# Patient Record
Sex: MALE | Race: WHITE | Employment: OTHER | URBAN - METROPOLITAN AREA
[De-identification: names, ages, dates, MRNs, and addresses within clinical notes are randomized per-mention and may not be internally consistent; named-entity substitution may affect disease eponyms.]

---

## 2021-12-08 ENCOUNTER — TRANSCRIBE ORDER (OUTPATIENT)
Dept: SCHEDULING | Age: 86
End: 2021-12-08

## 2021-12-08 DIAGNOSIS — M80.052A FRACTURE OF LEFT HIP DUE TO OSTEOPOROSIS (HCC): Primary | ICD-10-CM

## 2021-12-08 DIAGNOSIS — M80.00XD AGE-RELATED OSTEOPOROSIS WITH CURRENT PATHOLOGICAL FRACTURE WITH ROUTINE HEALING: ICD-10-CM

## 2021-12-17 ENCOUNTER — TRANSCRIBE ORDER (OUTPATIENT)
Dept: SCHEDULING | Age: 86
End: 2021-12-17

## 2021-12-17 DIAGNOSIS — M80.00XD AGE-RELATED OSTEOPOROSIS WITH CURRENT PATHOL FX WITH ROUTINE HEALING: Primary | ICD-10-CM

## 2021-12-17 DIAGNOSIS — M80.052A: ICD-10-CM

## 2022-04-07 ENCOUNTER — TRANSCRIBE ORDER (OUTPATIENT)
Dept: SCHEDULING | Age: 87
End: 2022-04-07

## 2022-04-07 DIAGNOSIS — L03.119 CELLULITIS OF UNSPECIFIED PART OF LIMB: Primary | ICD-10-CM

## 2022-04-08 ENCOUNTER — HOSPITAL ENCOUNTER (OUTPATIENT)
Dept: ULTRASOUND IMAGING | Age: 87
Discharge: HOME | End: 2022-04-08
Attending: FAMILY MEDICINE
Payer: MEDICARE

## 2022-04-08 DIAGNOSIS — L03.119 CELLULITIS OF UNSPECIFIED PART OF LIMB: ICD-10-CM

## 2022-04-08 PROCEDURE — 93970 EXTREMITY STUDY: CPT

## 2023-05-12 ENCOUNTER — OFFICE VISIT (OUTPATIENT)
Age: 88
End: 2023-05-12
Payer: MEDICARE

## 2023-05-12 VITALS
OXYGEN SATURATION: 95 % | DIASTOLIC BLOOD PRESSURE: 62 MMHG | WEIGHT: 174 LBS | HEART RATE: 78 BPM | SYSTOLIC BLOOD PRESSURE: 110 MMHG | RESPIRATION RATE: 18 BRPM

## 2023-05-12 DIAGNOSIS — E11.42 TYPE 2 DIABETES MELLITUS WITH DIABETIC POLYNEUROPATHY, WITHOUT LONG-TERM CURRENT USE OF INSULIN (HCC): ICD-10-CM

## 2023-05-12 DIAGNOSIS — Z85.46 H/O PROSTATE CANCER: ICD-10-CM

## 2023-05-12 DIAGNOSIS — Z97.8 FOLEY CATHETER IN PLACE: ICD-10-CM

## 2023-05-12 DIAGNOSIS — M47.812 NECK ARTHRITIS: ICD-10-CM

## 2023-05-12 DIAGNOSIS — R45.1 AGITATION: ICD-10-CM

## 2023-05-12 DIAGNOSIS — R26.81 GAIT INSTABILITY: ICD-10-CM

## 2023-05-12 DIAGNOSIS — F32.A CHRONIC DEPRESSION: ICD-10-CM

## 2023-05-12 DIAGNOSIS — E55.9 VITAMIN D DEFICIENCY: ICD-10-CM

## 2023-05-12 DIAGNOSIS — E78.00 HYPERCHOLESTEROLEMIA: ICD-10-CM

## 2023-05-12 DIAGNOSIS — Z91.81 AT HIGH RISK FOR FALLS: ICD-10-CM

## 2023-05-12 DIAGNOSIS — M48.07 SPINAL STENOSIS OF LUMBOSACRAL REGION: ICD-10-CM

## 2023-05-12 DIAGNOSIS — I10 PRIMARY HYPERTENSION: Primary | ICD-10-CM

## 2023-05-12 DIAGNOSIS — I25.10 CORONARY ARTERY DISEASE INVOLVING NATIVE CORONARY ARTERY OF NATIVE HEART WITHOUT ANGINA PECTORIS: ICD-10-CM

## 2023-05-12 DIAGNOSIS — R41.3 MEMORY IMPAIRMENT: ICD-10-CM

## 2023-05-12 DIAGNOSIS — R33.9 URINARY RETENTION: ICD-10-CM

## 2023-05-12 DIAGNOSIS — Z87.81 HISTORY OF FRACTURE OF LEFT HIP: ICD-10-CM

## 2023-05-12 PROBLEM — S72.001A CLOSED FRACTURE OF RIGHT HIP (HCC): Status: ACTIVE | Noted: 2023-05-12

## 2023-05-12 PROCEDURE — G8427 DOCREV CUR MEDS BY ELIG CLIN: HCPCS | Performed by: INTERNAL MEDICINE

## 2023-05-12 PROCEDURE — 1036F TOBACCO NON-USER: CPT | Performed by: INTERNAL MEDICINE

## 2023-05-12 PROCEDURE — G8421 BMI NOT CALCULATED: HCPCS | Performed by: INTERNAL MEDICINE

## 2023-05-12 PROCEDURE — 99205 OFFICE O/P NEW HI 60 MIN: CPT | Performed by: INTERNAL MEDICINE

## 2023-05-12 PROCEDURE — 1123F ACP DISCUSS/DSCN MKR DOCD: CPT | Performed by: INTERNAL MEDICINE

## 2023-05-12 RX ORDER — TAMSULOSIN HYDROCHLORIDE 0.4 MG/1
0.4 CAPSULE ORAL DAILY
Qty: 90 CAPSULE | Refills: 1 | Status: SHIPPED | OUTPATIENT
Start: 2023-05-12

## 2023-05-12 RX ORDER — GABAPENTIN 100 MG/1
100 CAPSULE ORAL 2 TIMES DAILY
COMMUNITY
Start: 2023-03-13 | End: 2023-05-12 | Stop reason: SDUPTHER

## 2023-05-12 RX ORDER — METOPROLOL SUCCINATE 25 MG/1
12.5 TABLET, EXTENDED RELEASE ORAL DAILY
COMMUNITY
Start: 2023-03-31 | End: 2023-05-12 | Stop reason: SDUPTHER

## 2023-05-12 RX ORDER — METOPROLOL SUCCINATE 25 MG/1
12.5 TABLET, EXTENDED RELEASE ORAL DAILY
Qty: 90 TABLET | Refills: 1 | Status: SHIPPED | OUTPATIENT
Start: 2023-05-12

## 2023-05-12 RX ORDER — GABAPENTIN 100 MG/1
200 CAPSULE ORAL
COMMUNITY
End: 2023-05-12

## 2023-05-12 RX ORDER — SERTRALINE HYDROCHLORIDE 100 MG/1
100 TABLET, FILM COATED ORAL DAILY
Qty: 90 TABLET | Refills: 1 | Status: SHIPPED | OUTPATIENT
Start: 2023-05-12

## 2023-05-12 RX ORDER — GABAPENTIN 100 MG/1
100 CAPSULE ORAL 2 TIMES DAILY
Qty: 60 CAPSULE | Refills: 5 | Status: SHIPPED | OUTPATIENT
Start: 2023-05-12 | End: 2023-06-11

## 2023-05-12 RX ORDER — ATORVASTATIN CALCIUM 40 MG/1
20 TABLET, FILM COATED ORAL
COMMUNITY
Start: 2023-03-23 | End: 2023-05-12 | Stop reason: ALTCHOICE

## 2023-05-12 RX ORDER — QUETIAPINE FUMARATE 25 MG/1
25 TABLET, FILM COATED ORAL NIGHTLY
COMMUNITY
Start: 2023-03-31 | End: 2023-05-12 | Stop reason: SDUPTHER

## 2023-05-12 RX ORDER — SERTRALINE HYDROCHLORIDE 100 MG/1
100 TABLET, FILM COATED ORAL DAILY
COMMUNITY
End: 2023-05-12 | Stop reason: SDUPTHER

## 2023-05-12 RX ORDER — TAMSULOSIN HYDROCHLORIDE 0.4 MG/1
0.4 CAPSULE ORAL DAILY
COMMUNITY
Start: 2023-03-31 | End: 2023-05-12 | Stop reason: SDUPTHER

## 2023-05-12 RX ORDER — ATORVASTATIN CALCIUM 20 MG/1
20 TABLET, FILM COATED ORAL DAILY
Qty: 90 TABLET | Refills: 1 | Status: SHIPPED | OUTPATIENT
Start: 2023-05-12

## 2023-05-12 RX ORDER — ISOPROPYL ALCOHOL 0.75 G/1
SWAB TOPICAL
COMMUNITY
Start: 2023-02-10 | End: 2023-05-12 | Stop reason: ALTCHOICE

## 2023-05-12 RX ORDER — QUETIAPINE FUMARATE 25 MG/1
25 TABLET, FILM COATED ORAL NIGHTLY
Qty: 90 TABLET | Refills: 1 | Status: SHIPPED | OUTPATIENT
Start: 2023-05-12

## 2023-05-12 SDOH — ECONOMIC STABILITY: INCOME INSECURITY: HOW HARD IS IT FOR YOU TO PAY FOR THE VERY BASICS LIKE FOOD, HOUSING, MEDICAL CARE, AND HEATING?: NOT HARD AT ALL

## 2023-05-12 SDOH — ECONOMIC STABILITY: HOUSING INSECURITY
IN THE LAST 12 MONTHS, WAS THERE A TIME WHEN YOU DID NOT HAVE A STEADY PLACE TO SLEEP OR SLEPT IN A SHELTER (INCLUDING NOW)?: NO

## 2023-05-12 SDOH — ECONOMIC STABILITY: FOOD INSECURITY: WITHIN THE PAST 12 MONTHS, YOU WORRIED THAT YOUR FOOD WOULD RUN OUT BEFORE YOU GOT MONEY TO BUY MORE.: NEVER TRUE

## 2023-05-12 SDOH — ECONOMIC STABILITY: FOOD INSECURITY: WITHIN THE PAST 12 MONTHS, THE FOOD YOU BOUGHT JUST DIDN'T LAST AND YOU DIDN'T HAVE MONEY TO GET MORE.: NEVER TRUE

## 2023-05-12 ASSESSMENT — PATIENT HEALTH QUESTIONNAIRE - PHQ9
2. FEELING DOWN, DEPRESSED OR HOPELESS: 0
SUM OF ALL RESPONSES TO PHQ QUESTIONS 1-9: 0
SUM OF ALL RESPONSES TO PHQ9 QUESTIONS 1 & 2: 0
SUM OF ALL RESPONSES TO PHQ QUESTIONS 1-9: 0
SUM OF ALL RESPONSES TO PHQ QUESTIONS 1-9: 0
1. LITTLE INTEREST OR PLEASURE IN DOING THINGS: 0
SUM OF ALL RESPONSES TO PHQ QUESTIONS 1-9: 0

## 2023-05-12 ASSESSMENT — ENCOUNTER SYMPTOMS
RESPIRATORY NEGATIVE: 1
ALLERGIC/IMMUNOLOGIC NEGATIVE: 1
ABDOMINAL PAIN: 0
SHORTNESS OF BREATH: 0
EYES NEGATIVE: 1
GASTROINTESTINAL NEGATIVE: 1
BACK PAIN: 1

## 2023-05-12 ASSESSMENT — ANXIETY QUESTIONNAIRES
2. NOT BEING ABLE TO STOP OR CONTROL WORRYING: 0
5. BEING SO RESTLESS THAT IT IS HARD TO SIT STILL: 0
IF YOU CHECKED OFF ANY PROBLEMS ON THIS QUESTIONNAIRE, HOW DIFFICULT HAVE THESE PROBLEMS MADE IT FOR YOU TO DO YOUR WORK, TAKE CARE OF THINGS AT HOME, OR GET ALONG WITH OTHER PEOPLE: NOT DIFFICULT AT ALL
6. BECOMING EASILY ANNOYED OR IRRITABLE: 0
1. FEELING NERVOUS, ANXIOUS, OR ON EDGE: 0
7. FEELING AFRAID AS IF SOMETHING AWFUL MIGHT HAPPEN: 0
3. WORRYING TOO MUCH ABOUT DIFFERENT THINGS: 0
4. TROUBLE RELAXING: 0
GAD7 TOTAL SCORE: 0

## 2023-05-12 NOTE — PROGRESS NOTES
nightly at bedtime. DISCONTD: tamsulosin (FLOMAX) 0.4 MG capsule     Sig: Take 1 capsule by mouth daily    DISCONTD: gabapentin (NEURONTIN) 100 MG capsule     Sig: Take 1 capsule by mouth 2 times daily. DISCONTD: atorvastatin (LIPITOR) 40 MG tablet     Si.5 tablets    DISCONTD: Magnesium 400 MG CAPS     Sig: Take by mouth    collagenase 250 UNIT/GM ointment     Sig: Apply topically daily Apply topically daily. DISCONTD: sertraline (ZOLOFT) 100 MG tablet     Sig: Take 1 tablet by mouth daily    DISCONTD: gabapentin (NEURONTIN) 100 MG capsule     Sig: Take 2 capsules by mouth nightly. Max Daily Amount: 200 mg    atorvastatin (LIPITOR) 20 MG tablet     Sig: Take 1 tablet by mouth daily     Dispense:  90 tablet     Refill:  1    gabapentin (NEURONTIN) 100 MG capsule     Sig: Take 1 capsule by mouth 2 times daily for 30 days. Max Daily Amount: 200 mg     Dispense:  60 capsule     Refill:  5    metFORMIN (GLUCOPHAGE) 500 MG tablet     Sig: Take 2 tablets by mouth daily (with breakfast)     Dispense:  90 tablet     Refill:  1    metoprolol succinate (TOPROL XL) 25 MG extended release tablet     Sig: Take 0.5 tablets by mouth daily     Dispense:  90 tablet     Refill:  1    QUEtiapine (SEROQUEL) 25 MG tablet     Sig: Take 1 tablet by mouth nightly at bedtime. Dispense:  90 tablet     Refill:  1    sertraline (ZOLOFT) 100 MG tablet     Sig: Take 1 tablet by mouth daily     Dispense:  90 tablet     Refill:  1    tamsulosin (FLOMAX) 0.4 MG capsule     Sig: Take 1 capsule by mouth daily     Dispense:  90 capsule     Refill:  1    Magnesium 400 MG CAPS     Sig: Take 1 daily     Dispense:  90 capsule     Refill:  1        Return in about 4 weeks (around 2023).      Continue with current medical management and plan  lab results and schedule of future lab studies reviewed with patient  reviewed diet, exercise and weight control  reviewed medications and side effects in detail  F/u with other MD's/ providers as

## 2023-05-13 LAB
25(OH)D3+25(OH)D2 SERPL-MCNC: 25.1 NG/ML (ref 30–100)
ALBUMIN SERPL-MCNC: 3.9 G/DL (ref 3.6–4.6)
ALBUMIN/CREAT UR: 182 MG/G CREAT (ref 0–29)
ALBUMIN/GLOB SERPL: 1.1 {RATIO} (ref 1.2–2.2)
ALP SERPL-CCNC: 232 IU/L (ref 44–121)
ALT SERPL-CCNC: 34 IU/L (ref 0–44)
AST SERPL-CCNC: 38 IU/L (ref 0–40)
BASOPHILS # BLD AUTO: 0.1 X10E3/UL (ref 0–0.2)
BASOPHILS NFR BLD AUTO: 1 %
BILIRUB SERPL-MCNC: 0.2 MG/DL (ref 0–1.2)
BUN SERPL-MCNC: 17 MG/DL (ref 8–27)
BUN/CREAT SERPL: 16 (ref 10–24)
CALCIUM SERPL-MCNC: 10 MG/DL (ref 8.6–10.2)
CHLORIDE SERPL-SCNC: 101 MMOL/L (ref 96–106)
CHOLEST SERPL-MCNC: 157 MG/DL (ref 100–199)
CO2 SERPL-SCNC: 24 MMOL/L (ref 20–29)
CREAT SERPL-MCNC: 1.05 MG/DL (ref 0.76–1.27)
CREAT UR-MCNC: 95.8 MG/DL
EGFRCR SERPLBLD CKD-EPI 2021: 68 ML/MIN/1.73
EOSINOPHIL # BLD AUTO: 0.1 X10E3/UL (ref 0–0.4)
EOSINOPHIL NFR BLD AUTO: 1 %
ERYTHROCYTE [DISTWIDTH] IN BLOOD BY AUTOMATED COUNT: 14.5 % (ref 11.6–15.4)
EST. AVERAGE GLUCOSE BLD GHB EST-MCNC: 148 MG/DL
GLOBULIN SER CALC-MCNC: 3.4 G/DL (ref 1.5–4.5)
GLUCOSE SERPL-MCNC: 173 MG/DL (ref 70–99)
HBA1C MFR BLD: 6.8 % (ref 4.8–5.6)
HCT VFR BLD AUTO: 40 % (ref 37.5–51)
HDLC SERPL-MCNC: 46 MG/DL
HGB BLD-MCNC: 13.2 G/DL (ref 13–17.7)
IMM GRANULOCYTES # BLD AUTO: 0 X10E3/UL (ref 0–0.1)
IMM GRANULOCYTES NFR BLD AUTO: 0 %
IMP & REVIEW OF LAB RESULTS: NORMAL
LDLC SERPL CALC-MCNC: 70 MG/DL (ref 0–99)
LYMPHOCYTES # BLD AUTO: 1.2 X10E3/UL (ref 0.7–3.1)
LYMPHOCYTES NFR BLD AUTO: 14 %
MCH RBC QN AUTO: 29.9 PG (ref 26.6–33)
MCHC RBC AUTO-ENTMCNC: 33 G/DL (ref 31.5–35.7)
MCV RBC AUTO: 91 FL (ref 79–97)
MICROALBUMIN UR-MCNC: 174.5 UG/ML
MONOCYTES # BLD AUTO: 0.8 X10E3/UL (ref 0.1–0.9)
MONOCYTES NFR BLD AUTO: 9 %
NEUTROPHILS # BLD AUTO: 6.4 X10E3/UL (ref 1.4–7)
NEUTROPHILS NFR BLD AUTO: 75 %
PLATELET # BLD AUTO: 320 X10E3/UL (ref 150–450)
POTASSIUM SERPL-SCNC: 4.4 MMOL/L (ref 3.5–5.2)
PROT SERPL-MCNC: 7.3 G/DL (ref 6–8.5)
RBC # BLD AUTO: 4.42 X10E6/UL (ref 4.14–5.8)
SODIUM SERPL-SCNC: 139 MMOL/L (ref 134–144)
TRIGL SERPL-MCNC: 256 MG/DL (ref 0–149)
VLDLC SERPL CALC-MCNC: 41 MG/DL (ref 5–40)
WBC # BLD AUTO: 8.5 X10E3/UL (ref 3.4–10.8)

## 2023-05-16 ENCOUNTER — TELEPHONE (OUTPATIENT)
Age: 88
End: 2023-05-16

## 2023-05-16 DIAGNOSIS — E55.9 VITAMIN D DEFICIENCY: Primary | ICD-10-CM

## 2023-05-16 LAB
APPEARANCE UR: ABNORMAL
BACTERIA #/AREA URNS HPF: ABNORMAL /[HPF]
BACTERIA UR CULT: NORMAL
BILIRUB UR QL STRIP: NEGATIVE
CASTS URNS MICRO: ABNORMAL
CASTS URNS QL MICRO: PRESENT /LPF
COLOR UR: YELLOW
CRYSTALS URNS MICRO: ABNORMAL
EPI CELLS #/AREA URNS HPF: ABNORMAL /HPF (ref 0–10)
GLUCOSE UR QL STRIP: ABNORMAL
HGB UR QL STRIP: NEGATIVE
IMP & REVIEW OF LAB RESULTS: NORMAL
KETONES UR QL STRIP: NEGATIVE
LEUKOCYTE ESTERASE UR QL STRIP: ABNORMAL
MICRO URNS: ABNORMAL
NITRITE UR QL STRIP: POSITIVE
PH UR STRIP: 8.5 [PH] (ref 5–7.5)
PROT UR QL STRIP: ABNORMAL
RBC #/AREA URNS HPF: ABNORMAL /HPF (ref 0–2)
SP GR UR STRIP: 1.02 (ref 1–1.03)
UNIDENT CRYS URNS QL MICRO: PRESENT
URINALYSIS REFLEX: ABNORMAL
UROBILINOGEN UR STRIP-MCNC: 0.2 MG/DL (ref 0.2–1)
WBC #/AREA URNS HPF: >30 /HPF (ref 0–5)

## 2023-05-17 NOTE — TELEPHONE ENCOUNTER
RECOMMENDATIONS:  Low Vitamin D. I have sent in Vitamin D of 50k unit. Take 1 cap every week for 12 weeks then you can purchase Vitamin D of 1000 units and take them daily. Other labs including diabetes and cholesterol look okay  Spoke with patient after verifying name and . Notified patient of lab results and recommendation from provider. Patient verbalized understanding and given a chance to ask questions.

## 2023-05-18 RX ORDER — ERGOCALCIFEROL 1.25 MG/1
50000 CAPSULE ORAL WEEKLY
Qty: 12 CAPSULE | Refills: 0 | Status: SHIPPED | OUTPATIENT
Start: 2023-05-18

## 2023-06-20 ENCOUNTER — TELEPHONE (OUTPATIENT)
Age: 88
End: 2023-06-20

## 2023-06-20 ENCOUNTER — OFFICE VISIT (OUTPATIENT)
Age: 88
End: 2023-06-20
Payer: MEDICARE

## 2023-06-20 VITALS
HEIGHT: 70 IN | OXYGEN SATURATION: 99 % | DIASTOLIC BLOOD PRESSURE: 66 MMHG | SYSTOLIC BLOOD PRESSURE: 122 MMHG | RESPIRATION RATE: 16 BRPM | BODY MASS INDEX: 24.97 KG/M2 | HEART RATE: 76 BPM | TEMPERATURE: 98 F

## 2023-06-20 DIAGNOSIS — E11.621 DIABETIC ULCER OF TOE OF LEFT FOOT ASSOCIATED WITH TYPE 2 DIABETES MELLITUS, WITH FAT LAYER EXPOSED (HCC): ICD-10-CM

## 2023-06-20 DIAGNOSIS — Z00.00 MEDICARE ANNUAL WELLNESS VISIT, SUBSEQUENT: ICD-10-CM

## 2023-06-20 DIAGNOSIS — R45.1 AGITATION: ICD-10-CM

## 2023-06-20 DIAGNOSIS — L97.522 DIABETIC ULCER OF TOE OF LEFT FOOT ASSOCIATED WITH TYPE 2 DIABETES MELLITUS, WITH FAT LAYER EXPOSED (HCC): ICD-10-CM

## 2023-06-20 DIAGNOSIS — E78.00 HYPERCHOLESTEROLEMIA: ICD-10-CM

## 2023-06-20 DIAGNOSIS — R33.9 URINARY RETENTION: ICD-10-CM

## 2023-06-20 DIAGNOSIS — E11.42 TYPE 2 DIABETES MELLITUS WITH DIABETIC POLYNEUROPATHY, WITHOUT LONG-TERM CURRENT USE OF INSULIN (HCC): Primary | ICD-10-CM

## 2023-06-20 DIAGNOSIS — R26.81 GAIT INSTABILITY: ICD-10-CM

## 2023-06-20 DIAGNOSIS — I10 PRIMARY HYPERTENSION: ICD-10-CM

## 2023-06-20 DIAGNOSIS — I25.10 CORONARY ARTERY DISEASE INVOLVING NATIVE CORONARY ARTERY OF NATIVE HEART WITHOUT ANGINA PECTORIS: ICD-10-CM

## 2023-06-20 DIAGNOSIS — Z97.8 FOLEY CATHETER IN PLACE: ICD-10-CM

## 2023-06-20 PROCEDURE — 3044F HG A1C LEVEL LT 7.0%: CPT | Performed by: INTERNAL MEDICINE

## 2023-06-20 PROCEDURE — 1036F TOBACCO NON-USER: CPT | Performed by: INTERNAL MEDICINE

## 2023-06-20 PROCEDURE — G0439 PPPS, SUBSEQ VISIT: HCPCS | Performed by: INTERNAL MEDICINE

## 2023-06-20 PROCEDURE — G8420 CALC BMI NORM PARAMETERS: HCPCS | Performed by: INTERNAL MEDICINE

## 2023-06-20 PROCEDURE — 99214 OFFICE O/P EST MOD 30 MIN: CPT | Performed by: INTERNAL MEDICINE

## 2023-06-20 PROCEDURE — 1123F ACP DISCUSS/DSCN MKR DOCD: CPT | Performed by: INTERNAL MEDICINE

## 2023-06-20 PROCEDURE — G8427 DOCREV CUR MEDS BY ELIG CLIN: HCPCS | Performed by: INTERNAL MEDICINE

## 2023-06-20 RX ORDER — GABAPENTIN 100 MG/1
200 CAPSULE ORAL 2 TIMES DAILY
Qty: 120 CAPSULE | Refills: 2 | Status: SHIPPED | OUTPATIENT
Start: 2023-06-20 | End: 2023-09-18

## 2023-06-20 RX ORDER — QUETIAPINE FUMARATE 25 MG/1
25 TABLET, FILM COATED ORAL NIGHTLY
Qty: 60 TABLET | Refills: 5 | Status: SHIPPED | OUTPATIENT
Start: 2023-06-20 | End: 2023-06-20 | Stop reason: SDUPTHER

## 2023-06-20 RX ORDER — QUETIAPINE FUMARATE 25 MG/1
25 TABLET, FILM COATED ORAL 2 TIMES DAILY
Qty: 60 TABLET | Refills: 5 | Status: SHIPPED | OUTPATIENT
Start: 2023-06-20

## 2023-06-20 SDOH — ECONOMIC STABILITY: INCOME INSECURITY: HOW HARD IS IT FOR YOU TO PAY FOR THE VERY BASICS LIKE FOOD, HOUSING, MEDICAL CARE, AND HEATING?: NOT VERY HARD

## 2023-06-20 SDOH — ECONOMIC STABILITY: FOOD INSECURITY: WITHIN THE PAST 12 MONTHS, YOU WORRIED THAT YOUR FOOD WOULD RUN OUT BEFORE YOU GOT MONEY TO BUY MORE.: NEVER TRUE

## 2023-06-20 SDOH — ECONOMIC STABILITY: FOOD INSECURITY: WITHIN THE PAST 12 MONTHS, THE FOOD YOU BOUGHT JUST DIDN'T LAST AND YOU DIDN'T HAVE MONEY TO GET MORE.: NEVER TRUE

## 2023-06-20 ASSESSMENT — PATIENT HEALTH QUESTIONNAIRE - PHQ9
3. TROUBLE FALLING OR STAYING ASLEEP: 0
8. MOVING OR SPEAKING SO SLOWLY THAT OTHER PEOPLE COULD HAVE NOTICED. OR THE OPPOSITE, BEING SO FIGETY OR RESTLESS THAT YOU HAVE BEEN MOVING AROUND A LOT MORE THAN USUAL: 0
6. FEELING BAD ABOUT YOURSELF - OR THAT YOU ARE A FAILURE OR HAVE LET YOURSELF OR YOUR FAMILY DOWN: 0
SUM OF ALL RESPONSES TO PHQ QUESTIONS 1-9: 0
SUM OF ALL RESPONSES TO PHQ QUESTIONS 1-9: 0
7. TROUBLE CONCENTRATING ON THINGS, SUCH AS READING THE NEWSPAPER OR WATCHING TELEVISION: 0
SUM OF ALL RESPONSES TO PHQ QUESTIONS 1-9: 0
5. POOR APPETITE OR OVEREATING: 0
1. LITTLE INTEREST OR PLEASURE IN DOING THINGS: 0
4. FEELING TIRED OR HAVING LITTLE ENERGY: 0
2. FEELING DOWN, DEPRESSED OR HOPELESS: 0
10. IF YOU CHECKED OFF ANY PROBLEMS, HOW DIFFICULT HAVE THESE PROBLEMS MADE IT FOR YOU TO DO YOUR WORK, TAKE CARE OF THINGS AT HOME, OR GET ALONG WITH OTHER PEOPLE: 0
9. THOUGHTS THAT YOU WOULD BE BETTER OFF DEAD, OR OF HURTING YOURSELF: 0
SUM OF ALL RESPONSES TO PHQ9 QUESTIONS 1 & 2: 0
SUM OF ALL RESPONSES TO PHQ QUESTIONS 1-9: 0

## 2023-06-20 ASSESSMENT — ANXIETY QUESTIONNAIRES
4. TROUBLE RELAXING: 0
1. FEELING NERVOUS, ANXIOUS, OR ON EDGE: 0
5. BEING SO RESTLESS THAT IT IS HARD TO SIT STILL: 0
3. WORRYING TOO MUCH ABOUT DIFFERENT THINGS: 0
IF YOU CHECKED OFF ANY PROBLEMS ON THIS QUESTIONNAIRE, HOW DIFFICULT HAVE THESE PROBLEMS MADE IT FOR YOU TO DO YOUR WORK, TAKE CARE OF THINGS AT HOME, OR GET ALONG WITH OTHER PEOPLE: NOT DIFFICULT AT ALL
2. NOT BEING ABLE TO STOP OR CONTROL WORRYING: 0
6. BECOMING EASILY ANNOYED OR IRRITABLE: 0
GAD7 TOTAL SCORE: 0
7. FEELING AFRAID AS IF SOMETHING AWFUL MIGHT HAPPEN: 0

## 2023-06-20 ASSESSMENT — ENCOUNTER SYMPTOMS
SHORTNESS OF BREATH: 0
ABDOMINAL PAIN: 0
EYES NEGATIVE: 1
BACK PAIN: 1
RESPIRATORY NEGATIVE: 1
ALLERGIC/IMMUNOLOGIC NEGATIVE: 1
GASTROINTESTINAL NEGATIVE: 1

## 2023-06-20 NOTE — TELEPHONE ENCOUNTER
Please change directions to twice a day for insurance purposes.     QUEtiapine (SEROQUEL) 25 MG tablet

## 2023-06-20 NOTE — PROGRESS NOTES
1. \"Have you been to the ER, urgent care clinic since your last visit? Hospitalized since your last visit? \" No    2. \"Have you seen or consulted any other health care providers outside of the [address] [place] [address] since your last visit? \" No    3. For patients aged 39-70: Has the patient had a colonoscopy / FIT/ Cologuard? Recommendation: Colonoscopy every 10y or annual FIT test from 50-75 or every 3 year stool DNA based test with consideration of ongoing screening from 76-85. If the patient is female:    4. For patients aged 41-77: Has the patient had a mammogram within the past 2 years? No    5. For patients aged 21-65: Has the patient had a pap smear?  No
Eris Townsend is a 80 y.o. male and presents for annual Medicare Wellness Visit. Problem List: Reviewed with patient and discussed risk factors. Patient Active Problem List   Diagnosis    Coronary artery disease involving native coronary artery of native heart without angina pectoris    Hypercholesterolemia    At high risk for falls    Primary hypertension    Chronic depression    Urinary retention    Type 2 diabetes mellitus with diabetic polyneuropathy, without long-term current use of insulin ([address]    Valenzuela catheter in place    H/O prostate cancer    Spinal stenosis of lumbosacral region    Gait instability    Closed fracture of right hip (HCC)    History of fracture of left hip    Neck arthritis    Memory impairment    Agitation    Vitamin D deficiency    Diabetic ulcer of toe of left foot associated with type 2 diabetes mellitus, with fat layer exposed ([facility] [address]    Medicare annual wellness visit, subsequent       Current medical providers:  Patient Care Team:  Dustin Dawson DO as PCP - General (Internal Medicine)  Dustin Dawson DO as PCP - Empaneled Provider    PSH: Reviewed with patient  Past Surgical History:   Procedure Laterality Date    OTHER SURGICAL HISTORY      radiation to start endd of month    TONSILLECTOMY          SH: Reviewed with patient  Social History     Tobacco Use    Smoking status: Never    Smokeless tobacco: Never   Substance Use Topics    Alcohol use: No    Drug use: No       FH: Reviewed with patient  No family history on file. Medications/Allergies: Reviewed with patient  Current Outpatient Medications on File Prior to Visit   Medication Sig Dispense Refill    vitamin D (ERGOCALCIFEROL) 1.25 MG (20823 UT) CAPS capsule Take 1 capsule by mouth once a week 12 capsule 0    collagenase 250 UNIT/GM ointment Apply topically daily Apply topically daily.       atorvastatin (LIPITOR) 20 MG tablet Take 1 tablet by mouth daily 90 tablet 1    metFORMIN (GLUCOPHAGE) 500 MG tablet Take 2
succinate (TOPROL XL) 25 MG extended release tablet Take 0.5 tablets by mouth daily Yes Harsh Leonardo DO   sertraline (ZOLOFT) 100 MG tablet Take 1 tablet by mouth daily Yes Harsh Leonardo DO   tamsulosin (FLOMAX) 0.4 MG capsule Take 1 capsule by mouth daily Yes Harsh Leonardo DO   Magnesium 400 MG CAPS Take 1 daily Yes Tien Becker DO       CareTeam (Including outside providers/suppliers regularly involved in providing care):   Patient Care Team:  Tien Becker DO as PCP - General (Internal Medicine)  Tien Becker DO as PCP - Empaneled Provider     Reviewed and updated this visit:  Tobacco  Allergies  Meds  Problems  Med Hx  Surg Hx  Soc Hx  Fam Hx

## 2023-06-23 ENCOUNTER — HOSPITAL ENCOUNTER (OUTPATIENT)
Facility: HOSPITAL | Age: 88
Discharge: HOME | End: 2023-06-23
Payer: MEDICARE

## 2023-06-23 VITALS
HEART RATE: 64 BPM | TEMPERATURE: 97.3 F | SYSTOLIC BLOOD PRESSURE: 109 MMHG | WEIGHT: 174 LBS | BODY MASS INDEX: 24.97 KG/M2 | DIASTOLIC BLOOD PRESSURE: 68 MMHG | RESPIRATION RATE: 18 BRPM

## 2023-06-23 DIAGNOSIS — L89.893 PRESSURE INJURY OF RIGHT FOOT, STAGE 3 (HCC): ICD-10-CM

## 2023-06-23 DIAGNOSIS — I73.9 PERIPHERAL VASCULAR DISEASE OF LOWER EXTREMITY (HCC): ICD-10-CM

## 2023-06-23 PROCEDURE — 99204 OFFICE O/P NEW MOD 45 MIN: CPT

## 2023-06-23 PROCEDURE — 11042 DBRDMT SUBQ TIS 1ST 20SQCM/<: CPT

## 2023-06-23 RX ORDER — BETAMETHASONE DIPROPIONATE 0.05 %
OINTMENT (GRAM) TOPICAL ONCE
OUTPATIENT
Start: 2023-06-23 | End: 2023-06-23

## 2023-06-23 RX ORDER — LIDOCAINE HYDROCHLORIDE 20 MG/ML
JELLY TOPICAL ONCE
OUTPATIENT
Start: 2023-06-23 | End: 2023-06-23

## 2023-06-23 RX ORDER — LIDOCAINE 40 MG/G
CREAM TOPICAL ONCE
OUTPATIENT
Start: 2023-06-23 | End: 2023-06-23

## 2023-06-23 RX ORDER — LIDOCAINE HYDROCHLORIDE 40 MG/ML
SOLUTION TOPICAL ONCE
OUTPATIENT
Start: 2023-06-23 | End: 2023-06-23

## 2023-06-23 RX ORDER — SODIUM CHLOR/HYPOCHLOROUS ACID 0.033 %
SOLUTION, IRRIGATION IRRIGATION ONCE
OUTPATIENT
Start: 2023-06-23 | End: 2023-06-23

## 2023-06-23 RX ORDER — IBUPROFEN 200 MG
TABLET ORAL ONCE
OUTPATIENT
Start: 2023-06-23 | End: 2023-06-23

## 2023-06-23 RX ORDER — LIDOCAINE 50 MG/G
OINTMENT TOPICAL ONCE
OUTPATIENT
Start: 2023-06-23 | End: 2023-06-23

## 2023-06-23 RX ORDER — BACITRACIN ZINC AND POLYMYXIN B SULFATE 500; 1000 [USP'U]/G; [USP'U]/G
OINTMENT TOPICAL ONCE
OUTPATIENT
Start: 2023-06-23 | End: 2023-06-23

## 2023-06-23 RX ORDER — CLOBETASOL PROPIONATE 0.5 MG/G
OINTMENT TOPICAL ONCE
OUTPATIENT
Start: 2023-06-23 | End: 2023-06-23

## 2023-06-23 RX ORDER — GINSENG 100 MG
CAPSULE ORAL ONCE
OUTPATIENT
Start: 2023-06-23 | End: 2023-06-23

## 2023-06-23 RX ORDER — GENTAMICIN SULFATE 1 MG/G
OINTMENT TOPICAL ONCE
OUTPATIENT
Start: 2023-06-23 | End: 2023-06-23

## 2023-06-23 ASSESSMENT — PAIN SCALES - GENERAL: PAINLEVEL_OUTOF10: 3

## 2023-06-23 ASSESSMENT — PAIN DESCRIPTION - LOCATION: LOCATION: FOOT

## 2023-06-23 ASSESSMENT — PAIN DESCRIPTION - ORIENTATION: ORIENTATION: LEFT;RIGHT

## 2023-06-23 ASSESSMENT — PAIN DESCRIPTION - DESCRIPTORS: DESCRIPTORS: ACHING

## 2023-06-23 ASSESSMENT — ENCOUNTER SYMPTOMS: RESPIRATORY NEGATIVE: 1

## 2023-06-23 NOTE — FLOWSHEET NOTE
06/23/23 1045   Wound 06/23/23 Foot Right;Lateral #1   Date First Assessed/Time First Assessed: 06/23/23 1004   Present on Hospital Admission: Yes  Location: Foot  Wound Location Orientation: Right;Lateral  Wound Description (Comments): #1   Dressing/Treatment Honey gel/honey paste;Gauze dressing/dressing sponge; Foam   Wound 06/23/23 Foot Left;Medial #2   Date First Assessed/Time First Assessed: 06/23/23 1005   Present on Hospital Admission: Yes  Location: Foot  Wound Location Orientation: Left;Medial  Wound Description (Comments): #2   Dressing/Treatment Honey gel/honey paste;Gauze dressing/dressing sponge; Foam   Wound 06/23/23 Toe (Comment  which one) Left;Dorsal #3 left foot 2nd dorsal toe   Date First Assessed/Time First Assessed: 06/23/23 1006   Location: Toe (Comment  which one)  Wound Location Orientation: Left;Dorsal  Wound Description (Comments): #3 left foot 2nd dorsal toe   Dressing/Treatment Honey gel/honey paste;Gauze dressing/dressing sponge;Tape/Soft cloth adhesive tape       Discharge Condition: Stable    Pain: 2    Ambulatory Status: Wheelchair    Discharge Destination: home    Transportation:car    Accompanied by: FAMILY    Discharge instructions reviewed with FAMILY and copy or written instructions have been provided. All questions/concerns have been addressed at this time.

## 2023-06-23 NOTE — PLAN OF CARE
Discharge Instructions for  [facility]  [address] [place], [address] [facility] [address]  Telephone: [phone]    NAME:  Alison Metzger OF BIRTH:  1934  ACCOUNT NUMBER :  [de-identified]  DATE:  6/23/2023       : Mariann Cruz RN     HOME HEALTH: [employer]    WOUND SUPPLIES:     REFERRALS or ORDERS:     Wound Cleansing:   Do not scrub or use excessive force. Cleanse wound prior to applying a clean dressing with:      [x] Cleanse wound with: BABY SHAMPOO LATHER AND  LEAVE 1-2 MINUTES ON WOUND THEN RINSE WITH WATER OR SALINE    [] May Shower at Discharge     [] Shower on days of dressing change, remove dressing first    [] Keep Wound Dry in Shower (may purchase a cast cover if needed)     Topical Treatments:  Do not apply lotions, creams, or ointments to wound bed unless directed. [] Apply moisturizing lotion   to skin surrounding the wound prior to dressing change.  [] Apply antifungal ointment to skin surrounding the wound prior to dressing change.  [] Apply thin film of Zinc barrier ointment to skin immediately around wound. [] Other:      Dressings:           Wound Location Right and left foot wounds    Apply Primary Dressing:       [x] MediHoney Gel    [] MediHoney Alginate  [] Alginate     [] Alginate with Silver       [] Collagen   [] Collagen with Silver     [] Hydrocolloid  [] Hydrofera Blue Classic (moisten with saline)  [] Hydrofera Blue Ready  [] Other:    [] Pack wound loosely with      Cover and Secure with:    [x] Gauze 1 piece    [] Markos   [] Kerlix  [] Ace Wrap     [] ABD  [] Medipore tape  [] tape  [x] Other: foam with silicone boarder    Avoid contact of tape with skin. Change dressing:   [] Daily      [] Every Other Day   [x] Three times per week  [] Once a week   [] Do Not Change Dressing     [] Other:    [x] Turn every 2 hours when in bed. Avoid position directing pressure on wound site.   Limit side lying to 30 degree

## 2023-06-23 NOTE — FLOWSHEET NOTE
06/23/23 0942   Anesthetic   Anesthetic 4% Lidocaine Liquid Topical   Right Leg Edema Point of Measurement   Leg circumference 32 cm   Ankle circumference 20 cm   Left Leg Edema Point of Measurement   Leg circumference 31 cm   Ankle circumference 19.8 cm   RLE Neurovascular Assessment   Capillary Refill Less than/Equal to 3 seconds   Color Appropriate for Ethnicity   Temperature Warm   R Pedal Pulse Doppler   LLE Neurovascular Assessment   Capillary Refill Less than/Equal to 3 seconds   Color Appropriate for Ethnicity   Temperature Warm   L Pedal Pulse Doppler   Wound 06/23/23 Foot Right;Lateral #1   Date First Assessed/Time First Assessed: 06/23/23 1004   Present on Hospital Admission: Yes  Location: Foot  Wound Location Orientation: Right;Lateral  Wound Description (Comments): #1   Wound Image    Wound Cleansed Cleansed with saline   Wound Length (cm) 0.7 cm   Wound Width (cm) 0.8 cm   Wound Depth (cm) 0 cm   Wound Surface Area (cm^2) 0.56 cm^2   Wound Volume (cm^3) 0 cm^3   Wound Assessment Other (Comment)  (dried scab/calloused)   Drainage Amount None   Odor None   Margins Attached edges   Wound 06/23/23 Foot Left;Medial #2   Date First Assessed/Time First Assessed: 06/23/23 1005   Present on Hospital Admission: Yes  Location: Foot  Wound Location Orientation: Left;Medial  Wound Description (Comments): #2   Wound Image    Wound Cleansed Cleansed with saline   Wound Length (cm) 1 cm   Wound Width (cm) 1 cm   Wound Depth (cm) 0.2 cm   Wound Surface Area (cm^2) 1 cm^2   Wound Volume (cm^3) 0.2 cm^3   Wound Assessment Slough;Pink/red   Drainage Amount Moderate   Drainage Description Serosanguinous   Odor None   Janet-wound Assessment Blanchable erythema; Maceration   Margins Flat/open edges   Wound 06/23/23 Toe (Comment  which one) Left;Dorsal #3 left foot 2nd dorsal toe   Date First Assessed/Time First Assessed: 06/23/23 1006   Location: Toe (Comment  which one)  Wound Location Orientation: Left;Dorsal  Wound

## 2023-06-23 NOTE — DISCHARGE INSTRUCTIONS
Discharge Instructions for  [facility]  [address] [place], [address] [facility] [address]  Telephone: [phone]    NAME:  Kaye Anthony OF BIRTH:  1934  ACCOUNT NUMBER :  [de-identified]  DATE:  6/23/2023       : Evangelista Barroso RN     HOME HEALTH: [employer]    WOUND SUPPLIES:     REFERRALS or ORDERS:     Wound Cleansing:   Do not scrub or use excessive force. Cleanse wound prior to applying a clean dressing with:      [x] Cleanse wound with: BABY SHAMPOO LATHER AND  LEAVE 1-2 MINUTES ON WOUND THEN RINSE WITH WATER OR SALINE    [] May Shower at Discharge     [] Shower on days of dressing change, remove dressing first    [] Keep Wound Dry in Shower (may purchase a cast cover if needed)     Topical Treatments:  Do not apply lotions, creams, or ointments to wound bed unless directed. [] Apply moisturizing lotion   to skin surrounding the wound prior to dressing change.  [] Apply antifungal ointment to skin surrounding the wound prior to dressing change.  [] Apply thin film of Zinc barrier ointment to skin immediately around wound. [] Other:      Dressings:           Wound Location Right and left foot wounds    Apply Primary Dressing:       [x] MediHoney Gel    [] MediHoney Alginate  [] Alginate     [] Alginate with Silver       [] Collagen   [] Collagen with Silver     [] Hydrocolloid  [] Hydrofera Blue Classic (moisten with saline)  [] Hydrofera Blue Ready  [] Other:    [] Pack wound loosely with      Cover and Secure with:    [x] Gauze 1 piece    [] Markos   [] Kerlix  [] Ace Wrap     [] ABD  [] Medipore tape  [] tape  [x] Other: foam with silicone boarder    Avoid contact of tape with skin. Change dressing:   [] Daily      [] Every Other Day   [x] Three times per week  [] Once a week   [] Do Not Change Dressing     [] Other:    [x] Turn every 2 hours when in bed. Avoid position directing pressure on wound site.   Limit side lying to 30 degree

## 2023-06-23 NOTE — CONSULTS
WOUND CARE Initial consult       Subjective:     Patient 66-year-old male hard of hearing, diabetes, who has been hospitalized a couple of times in recent past for urinary tract infections, with subsequent delirium, encephalopathy from disease, and sepsis what sounds like, who has been hospitalized, and by history of patient's wife and home caregiver, developed foot ulcers while hospitalized, and bed ridden and relatively immobile and while in [facility] rehab. Current therapy has been with Medihoney and dressings, patient does have pressure-relief booties he wears at night in bed but is still been wearing tennis shoes and other shoes when he ambulates, but only ambulates minimally with maximal assistance. The home nurse with him says he actually has improved the ulcers but there was noted to be some necrotic debris in the wounds and therefore he was sent to wound care for evaluation and management. He was seen by vascular surgery believe Dr. Jens Byrd in the past recently and ABIs were not assessable since calcifications in the ankles but his toe pressures on the right 0.66 and on the left 0.55 he does not have any history of peripheral vascular disease intervention that we know of although does have some cardiac stents. Wife does give a history of some pain when his legs are elevated and improved when he dangles the legs. Review of Systems   Constitutional:  Positive for fatigue. See HPI otherwise limited review of systems secondary to hearing issues and some cognitive dysfunction, generalized weakness and debilitation   Respiratory: Negative. Cardiovascular: Negative. All other systems reviewed and are negative. Objective:     Blood pressure 109/68, pulse 64, temperature 97.3 °F (36.3 °C), temperature source Temporal, resp. rate 18, weight 78.9 kg (174 lb).     Temp (24hrs), Av.3 °F (36.3 °C), Min:97.3 °F (36.3 °C), Max:97.3 °F (36.3 °C)      Physical Exam  Vitals and nursing

## 2023-06-29 ENCOUNTER — HOSPITAL ENCOUNTER (OUTPATIENT)
Facility: HOSPITAL | Age: 88
End: 2023-06-29
Attending: FAMILY MEDICINE
Payer: MEDICARE

## 2023-06-29 ENCOUNTER — HOSPITAL ENCOUNTER (OUTPATIENT)
Facility: HOSPITAL | Age: 88
Discharge: HOME | End: 2023-06-29
Payer: MEDICARE

## 2023-06-29 VITALS
HEART RATE: 71 BPM | TEMPERATURE: 97.3 F | DIASTOLIC BLOOD PRESSURE: 59 MMHG | SYSTOLIC BLOOD PRESSURE: 114 MMHG | RESPIRATION RATE: 18 BRPM

## 2023-06-29 DIAGNOSIS — L97.522 DIABETIC ULCER OF TOE OF LEFT FOOT ASSOCIATED WITH TYPE 2 DIABETES MELLITUS, WITH FAT LAYER EXPOSED (HCC): Primary | ICD-10-CM

## 2023-06-29 DIAGNOSIS — E11.621 DIABETIC ULCER OF TOE OF LEFT FOOT ASSOCIATED WITH TYPE 2 DIABETES MELLITUS, WITH FAT LAYER EXPOSED (HCC): Primary | ICD-10-CM

## 2023-06-29 DIAGNOSIS — L97.514 ULCER OF RIGHT FOOT WITH NECROSIS OF BONE (HCC): ICD-10-CM

## 2023-06-29 DIAGNOSIS — M79.605 LEFT LEG PAIN: ICD-10-CM

## 2023-06-29 DIAGNOSIS — L89.893 PRESSURE INJURY OF LEFT FOOT, STAGE 3 (HCC): ICD-10-CM

## 2023-06-29 DIAGNOSIS — I25.10 CORONARY ARTERY DISEASE INVOLVING NATIVE CORONARY ARTERY OF NATIVE HEART WITHOUT ANGINA PECTORIS: ICD-10-CM

## 2023-06-29 PROCEDURE — 11043 DBRDMT MUSC&/FSCA 1ST 20/<: CPT

## 2023-06-29 PROCEDURE — 73552 X-RAY EXAM OF FEMUR 2/>: CPT

## 2023-06-29 PROCEDURE — 73630 X-RAY EXAM OF FOOT: CPT

## 2023-06-29 PROCEDURE — 11042 DBRDMT SUBQ TIS 1ST 20SQCM/<: CPT

## 2023-06-29 RX ORDER — LIDOCAINE 40 MG/G
CREAM TOPICAL ONCE
OUTPATIENT
Start: 2023-06-29 | End: 2023-06-29

## 2023-06-29 RX ORDER — SODIUM CHLOR/HYPOCHLOROUS ACID 0.033 %
SOLUTION, IRRIGATION IRRIGATION ONCE
OUTPATIENT
Start: 2023-06-29 | End: 2023-06-29

## 2023-06-29 RX ORDER — IBUPROFEN 200 MG
TABLET ORAL ONCE
OUTPATIENT
Start: 2023-06-29 | End: 2023-06-29

## 2023-06-29 RX ORDER — GENTAMICIN SULFATE 1 MG/G
OINTMENT TOPICAL ONCE
OUTPATIENT
Start: 2023-06-29 | End: 2023-06-29

## 2023-06-29 RX ORDER — CLOBETASOL PROPIONATE 0.5 MG/G
OINTMENT TOPICAL ONCE
OUTPATIENT
Start: 2023-06-29 | End: 2023-06-29

## 2023-06-29 RX ORDER — BACITRACIN ZINC AND POLYMYXIN B SULFATE 500; 1000 [USP'U]/G; [USP'U]/G
OINTMENT TOPICAL ONCE
OUTPATIENT
Start: 2023-06-29 | End: 2023-06-29

## 2023-06-29 RX ORDER — LIDOCAINE HYDROCHLORIDE 20 MG/ML
JELLY TOPICAL ONCE
OUTPATIENT
Start: 2023-06-29 | End: 2023-06-29

## 2023-06-29 RX ORDER — LIDOCAINE 50 MG/G
OINTMENT TOPICAL ONCE
OUTPATIENT
Start: 2023-06-29 | End: 2023-06-29

## 2023-06-29 RX ORDER — LIDOCAINE HYDROCHLORIDE 40 MG/ML
SOLUTION TOPICAL ONCE
OUTPATIENT
Start: 2023-06-29 | End: 2023-06-29

## 2023-06-29 RX ORDER — BETAMETHASONE DIPROPIONATE 0.05 %
OINTMENT (GRAM) TOPICAL ONCE
OUTPATIENT
Start: 2023-06-29 | End: 2023-06-29

## 2023-06-29 RX ORDER — GINSENG 100 MG
CAPSULE ORAL ONCE
OUTPATIENT
Start: 2023-06-29 | End: 2023-06-29

## 2023-06-29 ASSESSMENT — PAIN SCALES - GENERAL: PAINLEVEL_OUTOF10: 10

## 2023-06-29 ASSESSMENT — PAIN DESCRIPTION - ORIENTATION: ORIENTATION: LEFT

## 2023-06-29 ASSESSMENT — PAIN DESCRIPTION - DESCRIPTORS: DESCRIPTORS: SHARP;SHOOTING

## 2023-06-29 ASSESSMENT — PAIN DESCRIPTION - LOCATION: LOCATION: LEG;FOOT

## 2023-07-03 ENCOUNTER — FOLLOWUP TELEPHONE ENCOUNTER (OUTPATIENT)
Facility: HOSPITAL | Age: 88
End: 2023-07-03

## 2023-07-05 ENCOUNTER — FOLLOWUP TELEPHONE ENCOUNTER (OUTPATIENT)
Facility: HOSPITAL | Age: 88
End: 2023-07-05

## 2023-07-05 NOTE — TELEPHONE ENCOUNTER
Culture results received, patient has a MRSA infection. Dr. Rudi Parson called and notified. MD prescribed Bactrim DS 1 tablet BID for 10 days. On behalf of Dr. Rudi Parson, this RN called in prescription for Bactrim DS at patient's [facility]. Patient's family notified. Patient has established appointment with the wound care clinic tomorrow, 7/6/2023.

## 2023-07-06 ENCOUNTER — HOSPITAL ENCOUNTER (OUTPATIENT)
Facility: HOSPITAL | Age: 88
Discharge: HOME | End: 2023-07-06
Payer: MEDICARE

## 2023-07-06 VITALS
HEART RATE: 71 BPM | RESPIRATION RATE: 18 BRPM | SYSTOLIC BLOOD PRESSURE: 96 MMHG | DIASTOLIC BLOOD PRESSURE: 58 MMHG | TEMPERATURE: 98.6 F

## 2023-07-06 DIAGNOSIS — L97.522 DIABETIC ULCER OF TOE OF LEFT FOOT ASSOCIATED WITH TYPE 2 DIABETES MELLITUS, WITH FAT LAYER EXPOSED (HCC): Primary | ICD-10-CM

## 2023-07-06 DIAGNOSIS — I25.10 CORONARY ARTERY DISEASE INVOLVING NATIVE CORONARY ARTERY OF NATIVE HEART WITHOUT ANGINA PECTORIS: ICD-10-CM

## 2023-07-06 DIAGNOSIS — E11.621 DIABETIC ULCER OF TOE OF LEFT FOOT ASSOCIATED WITH TYPE 2 DIABETES MELLITUS, WITH FAT LAYER EXPOSED (HCC): Primary | ICD-10-CM

## 2023-07-06 PROCEDURE — 97597 DBRDMT OPN WND 1ST 20 CM/<: CPT

## 2023-07-06 RX ORDER — SODIUM CHLOR/HYPOCHLOROUS ACID 0.033 %
SOLUTION, IRRIGATION IRRIGATION ONCE
OUTPATIENT
Start: 2023-07-06 | End: 2023-07-06

## 2023-07-06 RX ORDER — LIDOCAINE 40 MG/G
CREAM TOPICAL ONCE
OUTPATIENT
Start: 2023-07-06 | End: 2023-07-06

## 2023-07-06 RX ORDER — GENTAMICIN SULFATE 1 MG/G
OINTMENT TOPICAL ONCE
OUTPATIENT
Start: 2023-07-06 | End: 2023-07-06

## 2023-07-06 RX ORDER — BACITRACIN ZINC AND POLYMYXIN B SULFATE 500; 1000 [USP'U]/G; [USP'U]/G
OINTMENT TOPICAL ONCE
OUTPATIENT
Start: 2023-07-06 | End: 2023-07-06

## 2023-07-06 RX ORDER — LIDOCAINE HYDROCHLORIDE 20 MG/ML
JELLY TOPICAL ONCE
OUTPATIENT
Start: 2023-07-06 | End: 2023-07-06

## 2023-07-06 RX ORDER — GINSENG 100 MG
CAPSULE ORAL ONCE
OUTPATIENT
Start: 2023-07-06 | End: 2023-07-06

## 2023-07-06 RX ORDER — LIDOCAINE HYDROCHLORIDE 40 MG/ML
SOLUTION TOPICAL ONCE
OUTPATIENT
Start: 2023-07-06 | End: 2023-07-06

## 2023-07-06 RX ORDER — CLOBETASOL PROPIONATE 0.5 MG/G
OINTMENT TOPICAL ONCE
OUTPATIENT
Start: 2023-07-06 | End: 2023-07-06

## 2023-07-06 RX ORDER — LIDOCAINE 50 MG/G
OINTMENT TOPICAL ONCE
OUTPATIENT
Start: 2023-07-06 | End: 2023-07-06

## 2023-07-06 RX ORDER — BETAMETHASONE DIPROPIONATE 0.05 %
OINTMENT (GRAM) TOPICAL ONCE
OUTPATIENT
Start: 2023-07-06 | End: 2023-07-06

## 2023-07-06 RX ORDER — IBUPROFEN 200 MG
TABLET ORAL ONCE
OUTPATIENT
Start: 2023-07-06 | End: 2023-07-06

## 2023-07-06 ASSESSMENT — PAIN DESCRIPTION - FREQUENCY: FREQUENCY: INTERMITTENT

## 2023-07-06 ASSESSMENT — PAIN - FUNCTIONAL ASSESSMENT: PAIN_FUNCTIONAL_ASSESSMENT: PREVENTS OR INTERFERES SOME ACTIVE ACTIVITIES AND ADLS

## 2023-07-06 ASSESSMENT — PAIN DESCRIPTION - LOCATION: LOCATION: FOOT

## 2023-07-06 ASSESSMENT — PAIN SCALES - GENERAL: PAINLEVEL_OUTOF10: 10

## 2023-07-06 ASSESSMENT — PAIN DESCRIPTION - ORIENTATION: ORIENTATION: RIGHT;LEFT

## 2023-07-06 ASSESSMENT — PAIN DESCRIPTION - DESCRIPTORS: DESCRIPTORS: SHARP

## 2023-07-06 NOTE — DISCHARGE INSTRUCTIONS
Discharge Instructions for  [address]  Telephone: [phone]     NAME:  Marky Gibson OF BIRTH:  1934  ACCOUNT NUMBER :  [de-identified]  DATE:  7/6/2023     HOME HEALTH: [employer]    Wound Cleansing:   Do not scrub or use excessive force. Cleanse wound prior to applying a clean dressing with:    [x] VASHE wash    [] Cleanse wound with: BABY SHAMPOO LATHER AND  LEAVE 1-2 MINUTES ON WOUND THEN RINSE WITH WATER OR SALINE    [] May Shower at Discharge     [] Shower on days of dressing change, remove dressing first    [] Keep Wound Dry in Shower (may purchase a cast cover if needed)      Topical Treatments:  Do not apply lotions, creams, or ointments to wound bed unless directed. [] Apply moisturizing lotion to skin surrounding the wound prior to dressing change. [] Other:                 Dressings:      Wound Location Right and left foot wounds                 Apply Primary Dressing:                    [x] Santyl ointment - if unable to get Santyl ointment continue medi-honey gel  [x] Adaptic after santyl    Cover and Secure with:          [x] Gauze          [x] Rolled gauze, tape              Avoid contact of tape with skin     Change dressing:        [x] Daily             [] Every Other Day      [] Three times per week  [] Other:     Dressings:                Wound Location Left arm                       Apply Primary Dressing:                                [x] xeroform                Cover and Secure with:  [x] Gauze 1st  [] ABD            [] exudry     [] Markos           [] Kerlix          [x] Mepilex Border 2nd  [] Ace Wrap   [] Other:   [] Roll Tape                                Change dressing:        [] Daily             [] Every Other Day      [x] Three times per week  [] Other:      [x] Turn every 2 hours when in bed. Avoid position directing pressure on wound site.   Limit side lying to 30 degree

## 2023-07-06 NOTE — PROGRESS NOTES
[address] [place] [address]   Progress Note and Procedure Note   NO Procedure    Patient Name: Herson Moreno  : 1934  MRN: [id number]    Past Medical History:   Diagnosis Date    Diabetes ([address])     Heart failure ([address])     heart attack 2005    Heart failure ([address])     heart murmur    Hypertension     Prostate cancer [facility])      Past Surgical History:   Procedure Laterality Date    FRACTURE SURGERY      left hip 3years ago    OTHER SURGICAL HISTORY      radiation to start endd of month    TONSILLECTOMY       History reviewed. No pertinent family history. Social History     Tobacco Use    Smoking status: Former     Types: Cigarettes, Pipe     Quit date:      Years since quittin.5    Smokeless tobacco: Never   Vaping Use    Vaping Use: Never used   Substance Use Topics    Alcohol use: Not Currently    Drug use: No     No Known Allergies  Current Outpatient Medications on File Prior to Encounter   Medication Sig Dispense Refill    collagenase 250 UNIT/GM ointment Apply topically daily Apply thick layer to wounds daily. 30 g 1    gabapentin (NEURONTIN) 100 MG capsule Take 2 capsules by mouth in the morning and at bedtime for 90 days. Max Daily Amount: 400 mg 120 capsule 2    QUEtiapine (SEROQUEL) 25 MG tablet Take 1 tablet by mouth 2 times daily at bedtime.  60 tablet 5    vitamin D (ERGOCALCIFEROL) 1.25 MG (08356 UT) CAPS capsule Take 1 capsule by mouth once a week 12 capsule 0    atorvastatin (LIPITOR) 20 MG tablet Take 1 tablet by mouth daily 90 tablet 1    metFORMIN (GLUCOPHAGE) 500 MG tablet Take 2 tablets by mouth daily (with breakfast) 90 tablet 1    metoprolol succinate (TOPROL XL) 25 MG extended release tablet Take 0.5 tablets by mouth daily 90 tablet 1    sertraline (ZOLOFT) 100 MG tablet Take 1 tablet by mouth daily 90 tablet 1    tamsulosin (FLOMAX) 0.4 MG capsule Take 1 capsule by mouth daily 90 capsule 1    Magnesium 400 MG CAPS Take 1 daily 90 capsule 1     No current

## 2023-07-11 DIAGNOSIS — R45.1 AGITATION: ICD-10-CM

## 2023-07-11 NOTE — TELEPHONE ENCOUNTER
Patient's wife called stating that Danie Johnson told her to give patient more gabapentin for sundowning as needed. Because of this patient is out of medication and the pharmacy will not refill the medication because it is too soon. Patient's wife wants this fixed so she can give the patient the medication. Last script was written on 06/20/2023 for 120 capsules  With the directions of 2 caps by mouth in the morning and at bedtime.  Wife's call back number is [phone]

## 2023-07-13 ENCOUNTER — HOSPITAL ENCOUNTER (OUTPATIENT)
Facility: HOSPITAL | Age: 88
Discharge: HOME | End: 2023-07-13
Payer: MEDICARE

## 2023-07-13 VITALS
SYSTOLIC BLOOD PRESSURE: 102 MMHG | RESPIRATION RATE: 14 BRPM | HEART RATE: 69 BPM | TEMPERATURE: 97.2 F | DIASTOLIC BLOOD PRESSURE: 67 MMHG

## 2023-07-13 DIAGNOSIS — E11.621 DIABETIC ULCER OF TOE OF LEFT FOOT ASSOCIATED WITH TYPE 2 DIABETES MELLITUS, WITH FAT LAYER EXPOSED (HCC): Primary | ICD-10-CM

## 2023-07-13 DIAGNOSIS — L97.522 DIABETIC ULCER OF TOE OF LEFT FOOT ASSOCIATED WITH TYPE 2 DIABETES MELLITUS, WITH FAT LAYER EXPOSED (HCC): Primary | ICD-10-CM

## 2023-07-13 DIAGNOSIS — I25.10 CORONARY ARTERY DISEASE INVOLVING NATIVE CORONARY ARTERY OF NATIVE HEART WITHOUT ANGINA PECTORIS: ICD-10-CM

## 2023-07-13 PROCEDURE — 11043 DBRDMT MUSC&/FSCA 1ST 20/<: CPT

## 2023-07-13 PROCEDURE — 11042 DBRDMT SUBQ TIS 1ST 20SQCM/<: CPT

## 2023-07-13 RX ORDER — CLOBETASOL PROPIONATE 0.5 MG/G
OINTMENT TOPICAL ONCE
OUTPATIENT
Start: 2023-07-13 | End: 2023-07-13

## 2023-07-13 RX ORDER — LIDOCAINE 50 MG/G
OINTMENT TOPICAL ONCE
OUTPATIENT
Start: 2023-07-13 | End: 2023-07-13

## 2023-07-13 RX ORDER — GENTAMICIN SULFATE 1 MG/G
OINTMENT TOPICAL ONCE
OUTPATIENT
Start: 2023-07-13 | End: 2023-07-13

## 2023-07-13 RX ORDER — LIDOCAINE HYDROCHLORIDE 40 MG/ML
SOLUTION TOPICAL ONCE
OUTPATIENT
Start: 2023-07-13 | End: 2023-07-13

## 2023-07-13 RX ORDER — LIDOCAINE HYDROCHLORIDE 20 MG/ML
JELLY TOPICAL ONCE
OUTPATIENT
Start: 2023-07-13 | End: 2023-07-13

## 2023-07-13 RX ORDER — GINSENG 100 MG
CAPSULE ORAL ONCE
OUTPATIENT
Start: 2023-07-13 | End: 2023-07-13

## 2023-07-13 RX ORDER — BETAMETHASONE DIPROPIONATE 0.05 %
OINTMENT (GRAM) TOPICAL ONCE
OUTPATIENT
Start: 2023-07-13 | End: 2023-07-13

## 2023-07-13 RX ORDER — LIDOCAINE 40 MG/G
CREAM TOPICAL ONCE
OUTPATIENT
Start: 2023-07-13 | End: 2023-07-13

## 2023-07-13 RX ORDER — IBUPROFEN 200 MG
TABLET ORAL ONCE
OUTPATIENT
Start: 2023-07-13 | End: 2023-07-13

## 2023-07-13 RX ORDER — SODIUM CHLOR/HYPOCHLOROUS ACID 0.033 %
SOLUTION, IRRIGATION IRRIGATION ONCE
OUTPATIENT
Start: 2023-07-13 | End: 2023-07-13

## 2023-07-13 RX ORDER — BACITRACIN ZINC AND POLYMYXIN B SULFATE 500; 1000 [USP'U]/G; [USP'U]/G
OINTMENT TOPICAL ONCE
OUTPATIENT
Start: 2023-07-13 | End: 2023-07-13

## 2023-07-13 NOTE — FLOWSHEET NOTE
07/13/23 1359   Right Leg Edema Point of Measurement   Leg circumference 30.5 cm   Ankle circumference 20.5 cm   Left Leg Edema Point of Measurement   Leg circumference 30.6 cm   Ankle circumference 20.5 cm   RLE Neurovascular Assessment   Capillary Refill Less than/Equal to 3 seconds   Color Pale   Temperature Cool   R Pedal Pulse +1   LLE Neurovascular Assessment   Capillary Refill Less than/Equal to 3 seconds   Color Pale   Temperature Cool   L Pedal Pulse +1   Wound 06/29/23 Arm Left;Upper #4   Date First Assessed/Time First Assessed: 06/29/23 1004   Primary Wound Type: Traumatic  Location: Arm  Wound Location Orientation: Left;Upper  Wound Description (Comments): #4   Wound Image    Wound Length (cm) 0 cm   Wound Width (cm) 0 cm   Wound Depth (cm) 0 cm   Wound Surface Area (cm^2) 0 cm^2   Change in Wound Size % (l*w) 100   Wound Volume (cm^3) 0 cm^3   Wound Healing % 100   Wound Assessment Epithelialization   Drainage Amount None   Janet-wound Assessment Intact   Wound 06/23/23 Foot Right;Lateral #1   Date First Assessed/Time First Assessed: 06/23/23 1004   Present on Hospital Admission: Yes  Location: Foot  Wound Location Orientation: Right;Lateral  Wound Description (Comments): #1   Wound Image    Dressing Status Old drainage noted   Wound Cleansed Cleansed with saline   Wound Length (cm) 1.3 cm   Wound Width (cm) 1.2 cm   Wound Depth (cm) 0.2 cm   Wound Surface Area (cm^2) 1.56 cm^2   Change in Wound Size % (l*w) -178.57   Wound Volume (cm^3) 0.312 cm^3   Wound Assessment Eschar dry  (dried exudate)   Drainage Amount Small   Drainage Description Serosanguinous   Odor None   Janet-wound Assessment Blanchable erythema   Margins Defined edges   Wound 06/23/23 Foot Left;Medial #2   Date First Assessed/Time First Assessed: 06/23/23 1005   Present on Hospital Admission: Yes  Location: Foot  Wound Location Orientation: Left;Medial  Wound Description (Comments): #2   Wound Image    Dressing Status Old drainage

## 2023-07-13 NOTE — PROGRESS NOTES
Margins Defined edges 23 1359   Number of days: 20          -------    Past Medical History:   Diagnosis Date    Diabetes ([facility])     Heart failure ([address])     heart attack     Heart failure (HCC)     heart murmur    Hypertension     Prostate cancer [facility])      Past Surgical History:   Procedure Laterality Date    FRACTURE SURGERY      left hip 3years ago    OTHER SURGICAL HISTORY      radiation to start endd of month    TONSILLECTOMY       History reviewed. No pertinent family history. Social History     Socioeconomic History    Marital status:      Spouse name: None    Number of children: None    Years of education: None    Highest education level: None   Tobacco Use    Smoking status: Former     Types: Cigarettes, Pipe     Quit date:      Years since quittin.5    Smokeless tobacco: Never   Vaping Use    Vaping Use: Never used   Substance and Sexual Activity    Alcohol use: Not Currently    Drug use: No     Social Determinants of Health     Financial Resource Strain: Low Risk     Difficulty of Paying Living Expenses: Not very hard   Food Insecurity: No Food Insecurity    Worried About Running Out of Food in the Last Year: Never true    Ran Out of Food in the Last Year: Never true   Transportation Needs: Unknown    Lack of Transportation (Non-Medical): No   Housing Stability: Unknown    Unstable Housing in the Last Year: No        Prior to Admission medications    Medication Sig Start Date End Date Taking? Authorizing Provider   collagenase 250 UNIT/GM ointment Apply topically daily Apply thick layer to wounds daily. 23  Elidia Weaver MD   gabapentin (NEURONTIN) 100 MG capsule Take 2 capsules by mouth in the morning and at bedtime for 90 days. Max Daily Amount: 400 mg 23  Harsh Leonardo DO   QUEtiapine (SEROQUEL) 25 MG tablet Take 1 tablet by mouth 2 times daily at bedtime.  23   Harsh Leonardo DO   vitamin D (ERGOCALCIFEROL) 1.25 MG (77019 UT) CAPS capsule Take

## 2023-07-13 NOTE — FLOWSHEET NOTE
07/13/23 1504   Wound 06/23/23 Foot Right;Lateral #1   Date First Assessed/Time First Assessed: 06/23/23 1004   Present on Hospital Admission: Yes  Location: Foot  Wound Location Orientation: Right;Lateral  Wound Description (Comments): #1   Dressing/Treatment Other (comment);Gauze dressing/dressing sponge;Roll gauze;Tape/Soft cloth adhesive tape;Non adherent  (santyl ointment, netting)   Wound 06/23/23 Foot Left;Medial #2   Date First Assessed/Time First Assessed: 06/23/23 1005   Present on Hospital Admission: Yes  Location: Foot  Wound Location Orientation: Left;Medial  Wound Description (Comments): #2   Dressing/Treatment Other (comment); Non adherent;Gauze dressing/dressing sponge;Roll gauze;Tape/Soft cloth adhesive tape  (santyl ointment, netting)   Wound 06/23/23 Toe (Comment  which one) Left;Dorsal #3 left foot 2nd dorsal toe   Date First Assessed/Time First Assessed: 06/23/23 1006   Location: Toe (Comment  which one)  Wound Location Orientation: Left;Dorsal  Wound Description (Comments): #3 left foot 2nd dorsal toe   Dressing/Treatment Other (comment);Gauze dressing/dressing sponge;Tape/Soft cloth adhesive tape  (santyl)     Discharge Condition: Stable    Pain: 10    Ambulatory Status:Wheelchair    Discharge Destination: Home    Transportation:Car    Accompanied by: Self, Family, and Caregiver    Discharge instructions reviewed with Self, Family, and Caregiver and copy or written instructions have been provided. All questions/concerns have been addressed at this time.

## 2023-07-13 NOTE — DISCHARGE INSTRUCTIONS
Discharge Instructions for  [address]  Telephone: [phone]     NAME:  Rolanda Welch OF BIRTH:  1934  ACCOUNT NUMBER :  [de-identified]  DATE:  7/13/2023     HOME HEALTH: [facility] [employer]     Wound Cleansing:   Do not scrub or use excessive force. Cleanse wound prior to applying a clean dressing with:    [x] VASHE wash    [] Cleanse wound with: BABY SHAMPOO LATHER AND  LEAVE 1-2 MINUTES ON WOUND THEN RINSE WITH WATER OR SALINE    [] May Shower at Discharge     [] Shower on days of dressing change, remove dressing first    [] Keep Wound Dry in Shower (may purchase a cast cover if needed)      Topical Treatments:  Do not apply lotions, creams, or ointments to wound bed unless directed. [] Apply moisturizing lotion to skin surrounding the wound prior to dressing change. [] Other:                 Dressings:      Wound Location Right foot, left foot, left 2nd toe wounds                 Apply Primary Dressing:                    [x] Santyl ointment - nickel thick   [x] Adaptic after santyl     Cover and Secure with:          [x] Gauze          [x] Rolled gauze, tape              Avoid contact of tape with skin     Change dressing:        [x] Daily             [] Every Other Day      [] Three times per week  [] Other:     [x] Turn every 2 hours when in bed. Avoid position directing pressure on wound site. Limit side lying to 30 degree tilt. Limit HOB elevation to 30 degrees.                 Off-Loading: [x] Foam Boot(s) at night when sleeping, or try without  [x] Other: Diabetic slippers non skid      Dietary:  [x] Increase Protein: examples (Meat, cheese, eggs, greek yogurt, premier protein drink, fish, nuts)          [x] Victor M  [] Protien drink supplement   [] Recommended Supplements :     Return Appointment:     [x] Return Appointment: With Dr. Brant Lai  in  1 [facility])     [address]: Should

## 2023-07-14 RX ORDER — GABAPENTIN 100 MG/1
200 CAPSULE ORAL 2 TIMES DAILY
Qty: 360 CAPSULE | Refills: 0 | Status: SHIPPED | OUTPATIENT
Start: 2023-07-14 | End: 2023-10-12

## 2023-07-20 ENCOUNTER — HOSPITAL ENCOUNTER (OUTPATIENT)
Facility: HOSPITAL | Age: 88
Discharge: HOME | End: 2023-07-20
Payer: MEDICARE

## 2023-07-20 VITALS
TEMPERATURE: 98.1 F | SYSTOLIC BLOOD PRESSURE: 101 MMHG | HEART RATE: 65 BPM | DIASTOLIC BLOOD PRESSURE: 69 MMHG | RESPIRATION RATE: 18 BRPM

## 2023-07-20 DIAGNOSIS — I25.10 CORONARY ARTERY DISEASE INVOLVING NATIVE CORONARY ARTERY OF NATIVE HEART WITHOUT ANGINA PECTORIS: ICD-10-CM

## 2023-07-20 DIAGNOSIS — L97.522 DIABETIC ULCER OF TOE OF LEFT FOOT ASSOCIATED WITH TYPE 2 DIABETES MELLITUS, WITH FAT LAYER EXPOSED (HCC): Primary | ICD-10-CM

## 2023-07-20 DIAGNOSIS — E11.621 DIABETIC ULCER OF TOE OF LEFT FOOT ASSOCIATED WITH TYPE 2 DIABETES MELLITUS, WITH FAT LAYER EXPOSED (HCC): Primary | ICD-10-CM

## 2023-07-20 PROBLEM — Z00.00 MEDICARE ANNUAL WELLNESS VISIT, SUBSEQUENT: Status: RESOLVED | Noted: 2023-06-20 | Resolved: 2023-07-20

## 2023-07-20 PROCEDURE — 11042 DBRDMT SUBQ TIS 1ST 20SQCM/<: CPT

## 2023-07-20 RX ORDER — LIDOCAINE 50 MG/G
OINTMENT TOPICAL ONCE
OUTPATIENT
Start: 2023-07-20 | End: 2023-07-20

## 2023-07-20 RX ORDER — CLOBETASOL PROPIONATE 0.5 MG/G
OINTMENT TOPICAL ONCE
OUTPATIENT
Start: 2023-07-20 | End: 2023-07-20

## 2023-07-20 RX ORDER — GINSENG 100 MG
CAPSULE ORAL ONCE
OUTPATIENT
Start: 2023-07-20 | End: 2023-07-20

## 2023-07-20 RX ORDER — BETAMETHASONE DIPROPIONATE 0.05 %
OINTMENT (GRAM) TOPICAL ONCE
OUTPATIENT
Start: 2023-07-20 | End: 2023-07-20

## 2023-07-20 RX ORDER — SODIUM CHLOR/HYPOCHLOROUS ACID 0.033 %
SOLUTION, IRRIGATION IRRIGATION ONCE
OUTPATIENT
Start: 2023-07-20 | End: 2023-07-20

## 2023-07-20 RX ORDER — BACITRACIN ZINC AND POLYMYXIN B SULFATE 500; 1000 [USP'U]/G; [USP'U]/G
OINTMENT TOPICAL ONCE
OUTPATIENT
Start: 2023-07-20 | End: 2023-07-20

## 2023-07-20 RX ORDER — LIDOCAINE HYDROCHLORIDE 20 MG/ML
JELLY TOPICAL ONCE
OUTPATIENT
Start: 2023-07-20 | End: 2023-07-20

## 2023-07-20 RX ORDER — GENTAMICIN SULFATE 1 MG/G
OINTMENT TOPICAL ONCE
OUTPATIENT
Start: 2023-07-20 | End: 2023-07-20

## 2023-07-20 RX ORDER — LIDOCAINE HYDROCHLORIDE 40 MG/ML
SOLUTION TOPICAL ONCE
OUTPATIENT
Start: 2023-07-20 | End: 2023-07-20

## 2023-07-20 RX ORDER — IBUPROFEN 200 MG
TABLET ORAL ONCE
OUTPATIENT
Start: 2023-07-20 | End: 2023-07-20

## 2023-07-20 RX ORDER — LIDOCAINE 40 MG/G
CREAM TOPICAL ONCE
OUTPATIENT
Start: 2023-07-20 | End: 2023-07-20

## 2023-07-20 ASSESSMENT — PAIN SCALES - GENERAL: PAINLEVEL_OUTOF10: 10

## 2023-07-20 ASSESSMENT — PAIN DESCRIPTION - DESCRIPTORS: DESCRIPTORS: ACHING

## 2023-07-20 ASSESSMENT — PAIN DESCRIPTION - ORIENTATION: ORIENTATION: LEFT;RIGHT

## 2023-07-20 ASSESSMENT — PAIN DESCRIPTION - LOCATION: LOCATION: FOOT

## 2023-07-20 NOTE — DISCHARGE INSTRUCTIONS
[x] Return Appointment: With Dr. Hood Xiao  in  1 [facility])     [address]: Should you experience any significant changes in your wound(s) or have questions about your wound care, please contact the [address] at [facility] 8:00 am - 4:30 AND Friday 8:00 AM- 12:00 PM .  If you need help with your wound outside these hours and cannot wait until we are again available, contact your PCP or go to the hospital emergency room. PLEASE NOTE: IF YOU ARE UNABLE TO OBTAIN WOUND SUPPLIES, CONTINUE TO USE THE SUPPLIES YOU HAVE AVAILABLE UNTIL YOU ARE ABLE TO REACH US. IT IS MOST IMPORTANT TO KEEP THE WOUND COVERED AT ALL TIMES.      Physician Signature:_______________________ Dr. Hood Xaio

## 2023-07-20 NOTE — FLOWSHEET NOTE
07/20/23 1419   Anesthetic   Anesthetic 4% Lidocaine Liquid Topical   Right Leg Edema Point of Measurement   Leg circumference 32 cm   Ankle circumference 20 cm   Left Leg Edema Point of Measurement   Leg circumference 31 cm   Ankle circumference 21.3 cm   RLE Neurovascular Assessment   Capillary Refill Less than/Equal to 3 seconds   Color Appropriate for Ethnicity   Temperature Warm   R Pedal Pulse +2   LLE Neurovascular Assessment   Capillary Refill Less than/Equal to 3 seconds   Color Appropriate for Ethnicity   Temperature Warm   L Pedal Pulse +1   Wound 06/23/23 Foot Right;Lateral #1   Date First Assessed/Time First Assessed: 06/23/23 1004   Present on Hospital Admission: Yes  Location: Foot  Wound Location Orientation: Right;Lateral  Wound Description (Comments): #1   Wound Image    Wound Cleansed Cleansed with saline   Wound Length (cm) 1 cm   Wound Width (cm) 1 cm   Wound Depth (cm) 0.2 cm   Wound Surface Area (cm^2) 1 cm^2   Change in Wound Size % (l*w) -78.57   Wound Volume (cm^3) 0.2 cm^3   Wound Assessment Slough   Drainage Amount Moderate   Drainage Description Serous   Odor None   Janet-wound Assessment Dry/flaky; Non-blanchable erythema   Margins Flat/open edges   Wound 06/23/23 Foot Left;Medial #2   Date First Assessed/Time First Assessed: 06/23/23 1005   Present on Hospital Admission: Yes  Location: Foot  Wound Location Orientation: Left;Medial  Wound Description (Comments): #2   Wound Image    Wound Cleansed Cleansed with saline   Wound Length (cm) 1.8 cm   Wound Width (cm) 2 cm   Wound Depth (cm) 0.3 cm   Wound Surface Area (cm^2) 3.6 cm^2   Change in Wound Size % (l*w) -260   Wound Volume (cm^3) 1.08 cm^3   Wound Healing % -440   Wound Assessment Slough;Pink/red   Drainage Amount Moderate   Drainage Description Serosanguinous   Odor None   Janet-wound Assessment Blanchable erythema;Dry/flaky   Margins Epibole (rolled edges)   Wound 06/23/23 Toe (Comment  which one) Left;Dorsal #3 left foot 2nd

## 2023-07-20 NOTE — PROGRESS NOTES
lower extremity: absent  edema, foot warm,   DP pulse : 1+  PT pulse: 0   Nails dystrophic    Ulcer Description:   See Flowsheet           Data Review:   Culture   Heavy MRSA    Xray  Left femur  Healed left intratrochanteric fracture with hardware in place. Underlying osteopenia. Healed or healing left inferior pubic ramus fracture    Left foot  No definite evidence for osteomyelitis. CT and MR would be more  sensitive and specific for this diagnosis. Right foot  Osteopenic foot. No plain film evidence for osteomyelitis at this  time. However, MRI or CT would be more sensitive or specific           Procedure:     Ulcer assessment: Due to presence of necrotic tissue within the wound bed, ulcer requires debridement. Procedure: Debridement:   The indication for debridement was reviewed with patient. Risks of procedure (bleeding, infection, pain) were discussed with patient/ POAand consent signed on first visit.  Questions were answered      Subcutaneous excisional debridement left foot ulcer   Indication: to remove necrotic tissue/ vitalized and devitalized tissue/ infected tissue/ soft eschar  through skin and subcutaneous layer of wound bed  Time out done  Consent in chart   Anesthesia: Topical  lidocaine   Instrument: curette   Residual Necrosis: Present and scored   Bleeding: <1ml   Hemostasis: Pressure   Patient tolerated procedure well   Procedural Pain: mild  Post - procedural pain: none    Post debridement measurements: see below  Surface area debrided: <20  sq. cm    Muscle excisional debridement right foot ulcer   Indication: to remove necrotic tissue/ vitalized and devitalized tissue/ infected tissue /soft eschar through skin and subcutaneous layer, periosteal fascia layer of wound bed  Time out done  Consent in chart   Anesthesia: Topical  lidocaine   Instrument: curette B  Residual Necrosis: Present and scored   Bleeding: <1ml   Hemostasis: Pressure   Patient tolerated procedure well   Procedural

## 2023-07-20 NOTE — FLOWSHEET NOTE
07/20/23 1512   Wound 06/23/23 Foot Right;Lateral #1   Date First Assessed/Time First Assessed: 06/23/23 1004   Present on Hospital Admission: Yes  Location: Foot  Wound Location Orientation: Right;Lateral  Wound Description (Comments): #1   Wound Cleansed Vashe   Dressing/Treatment Gauze dressing/dressing sponge;Petroleum impregnated gauze; Roll gauze  (santyl)   Offloading for Diabetic Foot Ulcers Offloading ordered   Wound 06/23/23 Foot Left;Medial #2   Date First Assessed/Time First Assessed: 06/23/23 1005   Present on Hospital Admission: Yes  Location: Foot  Wound Location Orientation: Left;Medial  Wound Description (Comments): #2   Wound Cleansed Vashe   Dressing/Treatment Gauze dressing/dressing sponge;Petroleum impregnated gauze; Roll gauze  (santyl)   Offloading for Diabetic Foot Ulcers Offloading ordered   Wound 06/23/23 Toe (Comment  which one) Left;Dorsal #3 left foot 2nd dorsal toe   Date First Assessed/Time First Assessed: 06/23/23 1006   Location: Toe (Comment  which one)  Wound Location Orientation: Left;Dorsal  Wound Description (Comments): #3 left foot 2nd dorsal toe   Wound Cleansed Vashe   Dressing/Treatment Gauze dressing/dressing sponge;Petroleum impregnated gauze; Roll gauze  (santyl)     Discharge Condition: Stable    Pain: 0    Ambulatory Status: wheelchair    Discharge Destination: home    Transportation:car    Accompanied by: CAREGIVER    Discharge instructions reviewed with CAREGIVER and copy or written instructions have been provided. All questions/concerns have been addressed at this time.

## 2023-07-27 ENCOUNTER — TELEPHONE (OUTPATIENT)
Age: 88
End: 2023-07-27

## 2023-07-27 NOTE — TELEPHONE ENCOUNTER
ESTER Lin [facility]; [address] [facility] Staff 21 minutes ago (9:50 AM)     BD  Needs appt, we cannot call in abx without seeing pt

## 2023-07-27 NOTE — TELEPHONE ENCOUNTER
Lov6/20/23  Nov:10/18/23  [address]  Uti -cefuroxine 250mg- 14 day supply     Pt wife refused dispatch health and to take pt to er.  She wants to speak to Dr. Betty Chong nurse  Call placed to [facility] [place] [facility] pt - they will have a nurse reach out to Mrs Asuncion Schafer

## 2023-08-03 ENCOUNTER — HOSPITAL ENCOUNTER (OUTPATIENT)
Facility: HOSPITAL | Age: 88
Discharge: HOME | End: 2023-08-03
Payer: MEDICARE

## 2023-08-03 VITALS
SYSTOLIC BLOOD PRESSURE: 99 MMHG | RESPIRATION RATE: 15 BRPM | TEMPERATURE: 98 F | HEART RATE: 66 BPM | DIASTOLIC BLOOD PRESSURE: 52 MMHG

## 2023-08-03 DIAGNOSIS — E11.621 DIABETIC ULCER OF TOE OF LEFT FOOT ASSOCIATED WITH TYPE 2 DIABETES MELLITUS, WITH FAT LAYER EXPOSED (HCC): Primary | ICD-10-CM

## 2023-08-03 DIAGNOSIS — I25.10 CORONARY ARTERY DISEASE INVOLVING NATIVE CORONARY ARTERY OF NATIVE HEART WITHOUT ANGINA PECTORIS: ICD-10-CM

## 2023-08-03 DIAGNOSIS — L97.522 DIABETIC ULCER OF TOE OF LEFT FOOT ASSOCIATED WITH TYPE 2 DIABETES MELLITUS, WITH FAT LAYER EXPOSED (HCC): Primary | ICD-10-CM

## 2023-08-03 PROCEDURE — 11043 DBRDMT MUSC&/FSCA 1ST 20/<: CPT

## 2023-08-03 PROCEDURE — 11042 DBRDMT SUBQ TIS 1ST 20SQCM/<: CPT

## 2023-08-03 RX ORDER — SODIUM CHLOR/HYPOCHLOROUS ACID 0.033 %
SOLUTION, IRRIGATION IRRIGATION ONCE
OUTPATIENT
Start: 2023-08-03 | End: 2023-08-03

## 2023-08-03 RX ORDER — IBUPROFEN 200 MG
TABLET ORAL ONCE
OUTPATIENT
Start: 2023-08-03 | End: 2023-08-03

## 2023-08-03 RX ORDER — LIDOCAINE 40 MG/G
CREAM TOPICAL ONCE
OUTPATIENT
Start: 2023-08-03 | End: 2023-08-03

## 2023-08-03 RX ORDER — LIDOCAINE HYDROCHLORIDE 20 MG/ML
JELLY TOPICAL ONCE
OUTPATIENT
Start: 2023-08-03 | End: 2023-08-03

## 2023-08-03 RX ORDER — LIDOCAINE 50 MG/G
OINTMENT TOPICAL ONCE
OUTPATIENT
Start: 2023-08-03 | End: 2023-08-03

## 2023-08-03 RX ORDER — BETAMETHASONE DIPROPIONATE 0.05 %
OINTMENT (GRAM) TOPICAL ONCE
OUTPATIENT
Start: 2023-08-03 | End: 2023-08-03

## 2023-08-03 RX ORDER — BACITRACIN ZINC AND POLYMYXIN B SULFATE 500; 1000 [USP'U]/G; [USP'U]/G
OINTMENT TOPICAL ONCE
OUTPATIENT
Start: 2023-08-03 | End: 2023-08-03

## 2023-08-03 RX ORDER — CLOBETASOL PROPIONATE 0.5 MG/G
OINTMENT TOPICAL ONCE
OUTPATIENT
Start: 2023-08-03 | End: 2023-08-03

## 2023-08-03 RX ORDER — GINSENG 100 MG
CAPSULE ORAL ONCE
OUTPATIENT
Start: 2023-08-03 | End: 2023-08-03

## 2023-08-03 RX ORDER — LIDOCAINE HYDROCHLORIDE 40 MG/ML
SOLUTION TOPICAL ONCE
OUTPATIENT
Start: 2023-08-03 | End: 2023-08-03

## 2023-08-03 RX ORDER — GENTAMICIN SULFATE 1 MG/G
OINTMENT TOPICAL ONCE
OUTPATIENT
Start: 2023-08-03 | End: 2023-08-03

## 2023-08-03 ASSESSMENT — PAIN DESCRIPTION - LOCATION: LOCATION: FOOT

## 2023-08-03 ASSESSMENT — PAIN SCALES - GENERAL: PAINLEVEL_OUTOF10: 10

## 2023-08-03 ASSESSMENT — PAIN DESCRIPTION - DESCRIPTORS: DESCRIPTORS: ACHING

## 2023-08-03 ASSESSMENT — PAIN DESCRIPTION - ORIENTATION: ORIENTATION: LEFT;RIGHT

## 2023-08-03 NOTE — PROGRESS NOTES
Wound Center  Progress Note / Procedure Note      Chief Complaint:  Ra Tavares is a 80 y.o.  male  with foot wounds of >1 months duration. Assessment/Plan     80 y.o. male with known PAD    -Right lateral foot chronic ulcer. pressure, arterial  Full thickness  Improving Thinning necrotic  Slough/granular  Necessitates debridement  for wound healing and to prevent/heal infection  Ulcer needs debridement- see note below      -Left medial foot chronic ulcer.  -pressure, arterial  Full thickness  More thick dry necrotic; also periwound redness- could be irritation or infection  Slough/granular  Necessitates debridement  for wound healing and to prevent/heal infection  Ulcer needs debridement- see note below  Culture done  Hold neuropathy clinic for now; dressing is slid off on one side- could b causing irritation    -left toe ulcers x 1  Dry, leave open      -PAD  Will have to monitor   May need intervention if wounds not progressing    Following discussed with patient / spouse   Needs :  Serial debridement- prn    Good local wound care  Dressing:santyl  Frequency : daily  Rx [address]ill [address]       -Nutrition optimization      -Good offloading    spouse understood and agrees with plan. Questions answered. Follow up with me in 1 week          Subjective:       Since last visit  Started going to neuropathy clinic    HPI:     Since last visit:  Saw Dr Dex Portillo last week.  Medihoney continued  Today, wife upset wounds looks worse,   also c/o ++pain  Had a fall since last visit  C/o left leg pain    History/Chart/Medications reviewed  From Dr Montse Terrazas note:  Patient 79-year-old male hard of hearing, diabetes, who has been hospitalized a couple of times in recent past for urinary tract infections, with subsequent delirium, encephalopathy from disease, and sepsis what sounds like, who has been hospitalized, and by history of patient's wife and home caregiver, developed foot

## 2023-08-03 NOTE — FLOWSHEET NOTE
08/03/23 1126   Anesthetic   Anesthetic 4% Lidocaine Liquid Topical   Right Leg Edema Point of Measurement   Leg circumference 30.4 cm   Ankle circumference 20.6 cm   Left Leg Edema Point of Measurement   Leg circumference 31.4 cm   Ankle circumference 20.7 cm   RLE Neurovascular Assessment   Capillary Refill Less than/Equal to 3 seconds   Color Appropriate for Ethnicity   Temperature Warm   R Pedal Pulse Doppler   LLE Neurovascular Assessment   Capillary Refill Less than/Equal to 3 seconds   Color Appropriate for Ethnicity   Temperature Warm   L Pedal Pulse Doppler   Wound 06/23/23 Foot Right;Lateral #1   Date First Assessed/Time First Assessed: 06/23/23 1004   Present on Hospital Admission: Yes  Location: Foot  Wound Location Orientation: Right;Lateral  Wound Description (Comments): #1   Wound Image    Dressing Status Old drainage noted   Wound Length (cm) 0.5 cm   Wound Width (cm) 0.6 cm   Wound Depth (cm) 0.2 cm   Wound Surface Area (cm^2) 0.3 cm^2   Change in Wound Size % (l*w) 46.43   Wound Volume (cm^3) 0.06 cm^3   Wound Assessment Lucas/red;Slough   Drainage Amount Moderate   Drainage Description Serosanguinous   Odor None   Janet-wound Assessment Blanchable erythema   Margins Epibole (rolled edges)   Wound 06/23/23 Foot Left;Medial #2   Date First Assessed/Time First Assessed: 06/23/23 1005   Present on Hospital Admission: Yes  Location: Foot  Wound Location Orientation: Left;Medial  Wound Description (Comments): #2   Wound Image    Dressing Status Old drainage noted   Wound Length (cm) 1.7 cm   Wound Width (cm) 3 cm   Wound Depth (cm) 0.3 cm   Wound Surface Area (cm^2) 5.1 cm^2   Change in Wound Size % (l*w) -410   Wound Volume (cm^3) 1.53 cm^3   Wound Healing % -665   Wound Assessment Slough;Pink/red   Drainage Amount Moderate   Drainage Description Serosanguinous   Odor None   Janet-wound Assessment Blanchable erythema   Margins Epibole (rolled edges)   Wound 06/23/23 Toe (Comment  which one) Left;Dorsal

## 2023-08-03 NOTE — DISCHARGE INSTRUCTIONS
Discharge Instructions for  [address] [place]  Telephone: [phone]     NAME:  Guy Burroughs OF BIRTH:  1934  ACCOUNT NUMBER :  [de-identified]  DATE:  8/3/2023     HOME HEALTH: [employer]     Purchase a Gel cushion (purple cushion off brand) for wheelchair to help reduce pressure on buttocks      Wound Cleansing:   Do not scrub or use excessive force. Cleanse wound prior to applying a clean dressing with:    [x] VASHE wash    [] Cleanse wound with: BABY SHAMPOO LATHER AND  LEAVE 1-2 MINUTES ON WOUND THEN RINSE WITH WATER OR SALINE    [] May Shower at Discharge     [] Shower on days of dressing change, remove dressing first    [] Keep Wound Dry in Shower (may purchase a cast cover if needed)      Topical Treatments:  Do not apply lotions, creams, or ointments to wound bed unless directed. [] Apply moisturizing lotion to skin surrounding the wound prior to dressing change. [] Other:                 Dressings:      Wound Location Right foot, Left foot, Left 2nd toe wounds                 Apply Primary Dressing:                    [x] Santyl ointment - nickel thick   [x] Adaptic after santyl     Cover and Secure with:          [x] Gauze          [x] Rolled gauze, tape              Avoid contact of tape with skin     Change dressing:        [x] Daily             [] Every Other Day      [] Three times per week  [] Other:     [x] Turn every 2 hours when in bed. Avoid position directing pressure on wound site. Limit side lying to 30 degree tilt. Limit HOB elevation to 30 degrees.                 Off-Loading: [x] Foam Boot(s) at night when sleeping, or try without  [x] Other: Diabetic slippers non skid      Dietary:  [x] Increase Protein: examples (Meat, cheese, eggs, greek yogurt, premier protein drink, fish, nuts)          [x] Victor M  [] Protien drink supplement   [] Recommended Supplements :     Return Appointment:

## 2023-08-03 NOTE — FLOWSHEET NOTE
08/03/23 1232   Wound 06/23/23 Foot Right;Lateral #1   Date First Assessed/Time First Assessed: 06/23/23 1004   Present on Hospital Admission: Yes  Location: Foot  Wound Location Orientation: Right;Lateral  Wound Description (Comments): #1   Wound Cleansed Vashe   Dressing/Treatment Petroleum impregnated gauze;Gauze dressing/dressing sponge;Roll gauze;Tape/Soft cloth adhesive tape; Other (comment)  (santyl ointment)   Offloading for Diabetic Foot Ulcers Offloading ordered   Wound 06/23/23 Foot Left;Medial #2   Date First Assessed/Time First Assessed: 06/23/23 1005   Present on Hospital Admission: Yes  Location: Foot  Wound Location Orientation: Left;Medial  Wound Description (Comments): #2   Wound Cleansed Vashe   Dressing/Treatment Gauze dressing/dressing sponge;Petroleum impregnated gauze; Roll gauze;Tape/Soft cloth adhesive tape; Other (comment)  (Santyl ointment)   Offloading for Diabetic Foot Ulcers Offloading ordered   Wound 06/23/23 Toe (Comment  which one) Left;Dorsal #3 left foot 2nd dorsal toe   Date First Assessed/Time First Assessed: 06/23/23 1006   Location: Toe (Comment  which one)  Wound Location Orientation: Left;Dorsal  Wound Description (Comments): #3 left foot 2nd dorsal toe   Wound Cleansed Vashe   Dressing/Treatment Gauze dressing/dressing sponge;Petroleum impregnated gauze; Roll gauze;Tape/Soft cloth adhesive tape; Other (comment)  (santyl ointment)   Offloading for Diabetic Foot Ulcers Offloading ordered     Discharge Condition: Stable    Pain: 3    Ambulatory Status: Wheelchair    Discharge Destination: home    Transportation:car    Accompanied by: FAMILY    Discharge instructions reviewed with patient and copy or written instructions have been provided. All questions/concerns have been addressed at this time.

## 2023-08-10 ENCOUNTER — HOSPITAL ENCOUNTER (OUTPATIENT)
Facility: HOSPITAL | Age: 88
Discharge: HOME | End: 2023-08-10
Payer: MEDICARE

## 2023-08-10 VITALS
RESPIRATION RATE: 16 BRPM | DIASTOLIC BLOOD PRESSURE: 55 MMHG | TEMPERATURE: 98.2 F | HEART RATE: 71 BPM | SYSTOLIC BLOOD PRESSURE: 96 MMHG

## 2023-08-10 DIAGNOSIS — E11.621 DIABETIC ULCER OF TOE OF LEFT FOOT ASSOCIATED WITH TYPE 2 DIABETES MELLITUS, WITH FAT LAYER EXPOSED (HCC): Primary | ICD-10-CM

## 2023-08-10 DIAGNOSIS — I25.10 CORONARY ARTERY DISEASE INVOLVING NATIVE CORONARY ARTERY OF NATIVE HEART WITHOUT ANGINA PECTORIS: ICD-10-CM

## 2023-08-10 DIAGNOSIS — L97.522 DIABETIC ULCER OF TOE OF LEFT FOOT ASSOCIATED WITH TYPE 2 DIABETES MELLITUS, WITH FAT LAYER EXPOSED (HCC): Primary | ICD-10-CM

## 2023-08-10 DIAGNOSIS — L03.116 CELLULITIS OF LEFT FOOT: ICD-10-CM

## 2023-08-10 PROCEDURE — 11043 DBRDMT MUSC&/FSCA 1ST 20/<: CPT

## 2023-08-10 RX ORDER — CLOBETASOL PROPIONATE 0.5 MG/G
OINTMENT TOPICAL ONCE
OUTPATIENT
Start: 2023-08-10 | End: 2023-08-10

## 2023-08-10 RX ORDER — SODIUM CHLOR/HYPOCHLOROUS ACID 0.033 %
SOLUTION, IRRIGATION IRRIGATION ONCE
OUTPATIENT
Start: 2023-08-10 | End: 2023-08-10

## 2023-08-10 RX ORDER — GENTAMICIN SULFATE 1 MG/G
OINTMENT TOPICAL ONCE
OUTPATIENT
Start: 2023-08-10 | End: 2023-08-10

## 2023-08-10 RX ORDER — BACITRACIN ZINC AND POLYMYXIN B SULFATE 500; 1000 [USP'U]/G; [USP'U]/G
OINTMENT TOPICAL ONCE
OUTPATIENT
Start: 2023-08-10 | End: 2023-08-10

## 2023-08-10 RX ORDER — BETAMETHASONE DIPROPIONATE 0.05 %
OINTMENT (GRAM) TOPICAL ONCE
OUTPATIENT
Start: 2023-08-10 | End: 2023-08-10

## 2023-08-10 RX ORDER — LIDOCAINE HYDROCHLORIDE 40 MG/ML
SOLUTION TOPICAL ONCE
OUTPATIENT
Start: 2023-08-10 | End: 2023-08-10

## 2023-08-10 RX ORDER — AMOXICILLIN AND CLAVULANATE POTASSIUM 500; 125 MG/1; MG/1
1 TABLET, FILM COATED ORAL 2 TIMES DAILY
COMMUNITY

## 2023-08-10 RX ORDER — LIDOCAINE HYDROCHLORIDE 20 MG/ML
JELLY TOPICAL ONCE
OUTPATIENT
Start: 2023-08-10 | End: 2023-08-10

## 2023-08-10 RX ORDER — LIDOCAINE 40 MG/G
CREAM TOPICAL ONCE
OUTPATIENT
Start: 2023-08-10 | End: 2023-08-10

## 2023-08-10 RX ORDER — GINSENG 100 MG
CAPSULE ORAL ONCE
OUTPATIENT
Start: 2023-08-10 | End: 2023-08-10

## 2023-08-10 RX ORDER — IBUPROFEN 200 MG
TABLET ORAL ONCE
OUTPATIENT
Start: 2023-08-10 | End: 2023-08-10

## 2023-08-10 RX ORDER — LIDOCAINE 50 MG/G
OINTMENT TOPICAL ONCE
OUTPATIENT
Start: 2023-08-10 | End: 2023-08-10

## 2023-08-10 ASSESSMENT — PAIN DESCRIPTION - DESCRIPTORS: DESCRIPTORS: THROBBING

## 2023-08-10 ASSESSMENT — PAIN DESCRIPTION - LOCATION: LOCATION: FOOT

## 2023-08-10 ASSESSMENT — PAIN DESCRIPTION - ORIENTATION: ORIENTATION: LEFT;RIGHT

## 2023-08-10 ASSESSMENT — PAIN SCALES - GENERAL: PAINLEVEL_OUTOF10: 8

## 2023-08-10 NOTE — PROGRESS NOTES
Wound Center  Progress Note / Procedure Note      Chief Complaint:  Leon Gregg is a 80 y.o.  male  with foot wounds of >1 months duration. Assessment/Plan     80 y.o. male with known PAD    -Right lateral foot chronic ulcer. pressure, arterial  Full thickness  improving  Slough/granular  Necessitates debridement  for wound healing and to prevent/heal infection  Ulcer needs debridement- see note below      -Left medial foot chronic ulcer.  -pressure, arterial  Full thickness  infected  Slough/granular  Necessitates debridement  for wound healing and to prevent/heal infection  Ulcer needs debridement- see note below  Culture reviewed    Left foot cellulitis  Redness around wound and some redness and warmth on top of left  Foot  Has already been treated with bactrim for MRSA last month  Recommend Dalvance infusion since wound on bony part of foot and bone exposed under necrotic tissue  Recommend 2 infusions one week apart  Discussed SE including gi sx/ rash/ headache. Also adv to call urology office to see if still needs to be on augmentin.          -left toe ulcers x 1  Dry, leave open      -PAD  Will have to monitor   May need intervention if wounds not progressing    Following discussed with patient / spouse   Needs :  Serial debridement- prn    Good local wound care  Dressing:santyl  Frequency : daily      [address]    -Good offloading    spouse understood and agrees with plan. Questions answered. Follow up with me in 1 week      Subjective:       Since last visit  On augmentin for UTI    HPI:     Since last visit:  Saw Dr Keyanna Torres last week.  Medihoney continued  Today, wife upset wounds looks worse,   also c/o ++pain  Had a fall since last visit  C/o left leg pain    History/Chart/Medications reviewed  From Dr Joana Aleman note:  Patient 80-year-old male hard of hearing, diabetes, who has been hospitalized a couple of times in recent past for urinary tract infections, with subsequent

## 2023-08-10 NOTE — FLOWSHEET NOTE
08/10/23 1254   Wound 06/23/23 Foot Right;Lateral #1   Date First Assessed/Time First Assessed: 06/23/23 1004   Present on Hospital Admission: Yes  Location: Foot  Wound Location Orientation: Right;Lateral  Wound Description (Comments): #1   Wound Cleansed Vashe   Dressing/Treatment Other (comment);Gauze dressing/dressing sponge;Roll gauze;Tape/Soft cloth adhesive tape  (Santyl ointment then Adaptic)   Offloading for Diabetic Foot Ulcers Offloading ordered   Wound 06/23/23 Foot Left;Medial #2   Date First Assessed/Time First Assessed: 06/23/23 1005   Present on Hospital Admission: Yes  Location: Foot  Wound Location Orientation: Left;Medial  Wound Description (Comments): #2   Wound Cleansed Vashe   Dressing/Treatment Other (comment);Gauze dressing/dressing sponge;Roll gauze;Tape/Soft cloth adhesive tape  (Santyl ointment then Adaptic)   Offloading for Diabetic Foot Ulcers Offloading ordered   Wound 06/23/23 Toe (Comment  which one) Left;Dorsal #3 left foot 2nd dorsal toe   Date First Assessed/Time First Assessed: 06/23/23 1006   Location: Toe (Comment  which one)  Wound Location Orientation: Left;Dorsal  Wound Description (Comments): #3 left foot 2nd dorsal toe   Wound Cleansed Cleansed with saline   Dressing/Treatment Open to air   Offloading for Diabetic Foot Ulcers Offloading ordered   Pain Assessment   Pain Assessment 0-10   Pain Level 4   Pain Location Foot   Pain Orientation Right;Left   Pain Descriptors Throbbing     Discharge Condition: Stable    Pain: 4    Ambulatory Status:Wheelchair    Discharge Destination: Home    Transportation:Car    Accompanied by: Self, Family, and Caregiver    Discharge instructions reviewed with Self, Family, and Caregiver and copy or written instructions have been provided. All questions/concerns have been addressed at this time.

## 2023-08-10 NOTE — FLOWSHEET NOTE
08/10/23 1130   Anesthetic   Anesthetic 4% Lidocaine Liquid Topical   Right Leg Edema Point of Measurement   Leg circumference 32.3 cm   Ankle circumference 21.5 cm   Left Leg Edema Point of Measurement   Leg circumference 33.6 cm   Ankle circumference 21.5 cm   RLE Neurovascular Assessment   Capillary Refill Less than/Equal to 3 seconds   Color Appropriate for Ethnicity   Temperature Warm   R Pedal Pulse Doppler   RLE Sensation  Full sensation   LLE Neurovascular Assessment   Capillary Refill Less than/Equal to 3 seconds   Color Appropriate for Ethnicity   Temperature Warm   L Pedal Pulse Doppler   LLE Sensation  Full sensation   Wound 06/23/23 Foot Right;Lateral #1   Date First Assessed/Time First Assessed: 06/23/23 1004   Present on Hospital Admission: Yes  Location: Foot  Wound Location Orientation: Right;Lateral  Wound Description (Comments): #1   Wound Image    Dressing Status Old drainage noted   Wound Cleansed Cleansed with saline   Wound Length (cm) 0.5 cm   Wound Width (cm) 0.6 cm   Wound Depth (cm) 0.1 cm   Wound Surface Area (cm^2) 0.3 cm^2   Change in Wound Size % (l*w) 46.43   Wound Volume (cm^3) 0.03 cm^3   Wound Assessment Pale granulation tissue;Pink/red   Drainage Amount Moderate (25-50%)   Drainage Description Serosanguinous   Odor None   Janet-wound Assessment Maceration   Margins Flat/open edges;Epibole (rolled edges)   Wound 06/23/23 Foot Left;Medial #2   Date First Assessed/Time First Assessed: 06/23/23 1005   Present on Hospital Admission: Yes  Location: Foot  Wound Location Orientation: Left;Medial  Wound Description (Comments): #2   Wound Image    Dressing Status Old drainage noted   Wound Cleansed Cleansed with saline   Wound Length (cm) 2 cm   Wound Width (cm) 2 cm   Wound Depth (cm) 0.5 cm   Wound Surface Area (cm^2) 4 cm^2   Change in Wound Size % (l*w) -300   Wound Volume (cm^3) 2 cm^3   Wound Healing % -900   Wound Assessment Pink/red;Slough;Eschar moist  (dried exudate)   Drainage

## 2023-08-10 NOTE — DISCHARGE INSTRUCTIONS
Discharge Instructions for  [address] [place]  Telephone: [phone]     NAME:  Judith Burr OF BIRTH:  1934  ACCOUNT NUMBER :  [de-identified]  DATE:  8/10/2023     HOME HEALTH: [facility]     Purchase a Gel cushion (purple cushion off brand) for wheelchair to help reduce pressure on buttocks    A referral for IV infusion of Dalvance will be sent to the Outpatient Infusion center at [facility]. Someone from the Infusion center will call you to set up the appointment. Address:   [address] [place]  [phone]    Monday 9:00 AM - 5:00 PM  Tuesday 9:00 AM - 5:00 PM  Wednesday 9:00 AM - 5:00 PM  Thursday 9:00 AM - 5:00 PM  Friday 9:00 AM - 5:00 PM  Saturday Closed  Sunday Closed      Wound Cleansing:   Do not scrub or use excessive force. Cleanse wound prior to applying a clean dressing with:    [x] VASHE wash    [] Cleanse wound with: BABY SHAMPOO LATHER AND  LEAVE 1-2 MINUTES ON WOUND THEN RINSE WITH WATER OR SALINE    [] May Shower at Discharge     [] Shower on days of dressing change, remove dressing first    [] Keep Wound Dry in Shower (may purchase a cast cover if needed)      Topical Treatments:  Do not apply lotions, creams, or ointments to wound bed unless directed. [] Apply moisturizing lotion to skin surrounding the wound prior to dressing change.   [] Other:     Leave toe wound open to air                Dressings:      Wound Location Right foot, and Left foot                Apply Primary Dressing:                    [x] Santyl ointment - nickel thick   [x] Adaptic after santyl     Cover and Secure with:          [x] Gauze          [x] Rolled gauze, tape              Avoid contact of tape with skin     Change dressing:        [x] Daily             [] Every Other Day      [] Three times per week  [] Other:

## 2023-08-11 ENCOUNTER — HOSPITAL ENCOUNTER (OUTPATIENT)
Facility: HOSPITAL | Age: 88
Setting detail: INFUSION SERIES
End: 2023-08-11
Payer: MEDICARE

## 2023-08-11 VITALS
TEMPERATURE: 98.7 F | DIASTOLIC BLOOD PRESSURE: 60 MMHG | SYSTOLIC BLOOD PRESSURE: 122 MMHG | RESPIRATION RATE: 16 BRPM | HEART RATE: 99 BPM

## 2023-08-11 DIAGNOSIS — L03.116 CELLULITIS OF LEFT FOOT: Primary | ICD-10-CM

## 2023-08-11 PROCEDURE — 96365 THER/PROPH/DIAG IV INF INIT: CPT

## 2023-08-11 PROCEDURE — 6360000002 HC RX W HCPCS: Performed by: FAMILY MEDICINE

## 2023-08-11 PROCEDURE — 2580000003 HC RX 258: Performed by: FAMILY MEDICINE

## 2023-08-11 RX ADMIN — DALBAVANCIN 1000 MG: 500 INJECTION, POWDER, FOR SOLUTION INTRAVENOUS at 15:49

## 2023-08-11 ASSESSMENT — PAIN DESCRIPTION - LOCATION: LOCATION: FOOT

## 2023-08-11 ASSESSMENT — PAIN SCALES - GENERAL: PAINLEVEL_OUTOF10: 10

## 2023-08-11 ASSESSMENT — PAIN DESCRIPTION - ORIENTATION: ORIENTATION: RIGHT;LEFT

## 2023-08-11 ASSESSMENT — PAIN DESCRIPTION - DESCRIPTORS: DESCRIPTORS: THROBBING

## 2023-08-17 ENCOUNTER — HOSPITAL ENCOUNTER (OUTPATIENT)
Facility: HOSPITAL | Age: 88
Discharge: HOME | End: 2023-08-17
Payer: MEDICARE

## 2023-08-17 VITALS
HEIGHT: 67 IN | BODY MASS INDEX: 27.25 KG/M2 | TEMPERATURE: 98.1 F | HEART RATE: 69 BPM | DIASTOLIC BLOOD PRESSURE: 58 MMHG | RESPIRATION RATE: 16 BRPM | SYSTOLIC BLOOD PRESSURE: 104 MMHG

## 2023-08-17 DIAGNOSIS — I25.10 CORONARY ARTERY DISEASE INVOLVING NATIVE CORONARY ARTERY OF NATIVE HEART WITHOUT ANGINA PECTORIS: ICD-10-CM

## 2023-08-17 DIAGNOSIS — E11.621 DIABETIC ULCER OF TOE OF LEFT FOOT ASSOCIATED WITH TYPE 2 DIABETES MELLITUS, WITH FAT LAYER EXPOSED (HCC): Primary | ICD-10-CM

## 2023-08-17 DIAGNOSIS — L03.116 CELLULITIS OF LEFT FOOT: ICD-10-CM

## 2023-08-17 DIAGNOSIS — L97.522 DIABETIC ULCER OF TOE OF LEFT FOOT ASSOCIATED WITH TYPE 2 DIABETES MELLITUS, WITH FAT LAYER EXPOSED (HCC): Primary | ICD-10-CM

## 2023-08-17 PROCEDURE — 11042 DBRDMT SUBQ TIS 1ST 20SQCM/<: CPT

## 2023-08-17 PROCEDURE — 11043 DBRDMT MUSC&/FSCA 1ST 20/<: CPT

## 2023-08-17 RX ORDER — LIDOCAINE 50 MG/G
OINTMENT TOPICAL ONCE
OUTPATIENT
Start: 2023-08-17 | End: 2023-08-17

## 2023-08-17 RX ORDER — CLOBETASOL PROPIONATE 0.5 MG/G
OINTMENT TOPICAL ONCE
OUTPATIENT
Start: 2023-08-17 | End: 2023-08-17

## 2023-08-17 RX ORDER — LIDOCAINE HYDROCHLORIDE 20 MG/ML
JELLY TOPICAL ONCE
OUTPATIENT
Start: 2023-08-17 | End: 2023-08-17

## 2023-08-17 RX ORDER — LIDOCAINE HYDROCHLORIDE 40 MG/ML
SOLUTION TOPICAL ONCE
OUTPATIENT
Start: 2023-08-17 | End: 2023-08-17

## 2023-08-17 RX ORDER — BACITRACIN ZINC AND POLYMYXIN B SULFATE 500; 1000 [USP'U]/G; [USP'U]/G
OINTMENT TOPICAL ONCE
OUTPATIENT
Start: 2023-08-17 | End: 2023-08-17

## 2023-08-17 RX ORDER — GINSENG 100 MG
CAPSULE ORAL ONCE
OUTPATIENT
Start: 2023-08-17 | End: 2023-08-17

## 2023-08-17 RX ORDER — CLOTRIMAZOLE 1 %
CREAM (GRAM) TOPICAL
Qty: 60 G | Refills: 1 | Status: SHIPPED | OUTPATIENT
Start: 2023-08-17 | End: 2023-09-07

## 2023-08-17 RX ORDER — SODIUM CHLOR/HYPOCHLOROUS ACID 0.033 %
SOLUTION, IRRIGATION IRRIGATION ONCE
OUTPATIENT
Start: 2023-08-17 | End: 2023-08-17

## 2023-08-17 RX ORDER — LIDOCAINE 40 MG/G
CREAM TOPICAL ONCE
OUTPATIENT
Start: 2023-08-17 | End: 2023-08-17

## 2023-08-17 RX ORDER — GENTAMICIN SULFATE 1 MG/G
OINTMENT TOPICAL ONCE
OUTPATIENT
Start: 2023-08-17 | End: 2023-08-17

## 2023-08-17 RX ORDER — SACCHAROMYCES BOULARDII 250 MG
250 CAPSULE ORAL 2 TIMES DAILY
Qty: 14 CAPSULE | Refills: 0 | Status: SHIPPED | OUTPATIENT
Start: 2023-08-17 | End: 2023-08-24

## 2023-08-17 RX ORDER — BETAMETHASONE DIPROPIONATE 0.05 %
OINTMENT (GRAM) TOPICAL ONCE
OUTPATIENT
Start: 2023-08-17 | End: 2023-08-17

## 2023-08-17 RX ORDER — IBUPROFEN 200 MG
TABLET ORAL ONCE
OUTPATIENT
Start: 2023-08-17 | End: 2023-08-17

## 2023-08-17 ASSESSMENT — PAIN DESCRIPTION - FREQUENCY: FREQUENCY: CONTINUOUS

## 2023-08-17 ASSESSMENT — PAIN SCALES - GENERAL: PAINLEVEL_OUTOF10: 10

## 2023-08-17 ASSESSMENT — PAIN DESCRIPTION - LOCATION: LOCATION: FOOT

## 2023-08-17 ASSESSMENT — PAIN DESCRIPTION - ORIENTATION: ORIENTATION: RIGHT

## 2023-08-17 ASSESSMENT — PAIN - FUNCTIONAL ASSESSMENT: PAIN_FUNCTIONAL_ASSESSMENT: INTOLERABLE, UNABLE TO DO ANY ACTIVE OR PASSIVE ACTIVITIES

## 2023-08-17 ASSESSMENT — PAIN DESCRIPTION - ONSET: ONSET: AWAKENED FROM SLEEP

## 2023-08-17 ASSESSMENT — PAIN DESCRIPTION - DESCRIPTORS: DESCRIPTORS: THROBBING;STABBING

## 2023-08-17 NOTE — FLOWSHEET NOTE
08/17/23 1056   Anesthetic   Anesthetic 4% Lidocaine Liquid Topical   Right Leg Edema Point of Measurement   Leg circumference 30.4 cm   Ankle circumference 19.8 cm   Left Leg Edema Point of Measurement   Leg circumference 28.5 cm   Ankle circumference 22 cm   RLE Neurovascular Assessment   Capillary Refill Less than/Equal to 3 seconds   Color Appropriate for Ethnicity   Temperature Warm   R Pedal Pulse +1   LLE Neurovascular Assessment   Capillary Refill Less than/Equal to 3 seconds   Color Appropriate for Ethnicity   Temperature Warm   L Pedal Pulse +1   Wound 06/23/23 Foot Right;Lateral #1   Date First Assessed/Time First Assessed: 06/23/23 1004   Present on Hospital Admission: Yes  Location: Foot  Wound Location Orientation: Right;Lateral  Wound Description (Comments): #1   Wound Image    Wound Length (cm) 0.3 cm   Wound Width (cm) 0.4 cm   Wound Depth (cm) 0.3 cm   Wound Surface Area (cm^2) 0.12 cm^2   Change in Wound Size % (l*w) 78.57   Wound Volume (cm^3) 0.036 cm^3   Wound Assessment [place]/red;Slough   Drainage Amount Small (< 25%)   Drainage Description Serosanguinous   Odor None   Janet-wound Assessment Blanchable erythema   Margins Epibole (rolled edges)   Wound 06/23/23 Foot Left;Medial #2   Date First Assessed/Time First Assessed: 06/23/23 1005   Present on Hospital Admission: Yes  Location: Foot  Wound Location Orientation: Left;Medial  Wound Description (Comments): #2   Wound Image    Wound Length (cm) 1.9 cm   Wound Width (cm) 2.2 cm   Wound Depth (cm) 0.3 cm   Wound Surface Area (cm^2) 4.18 cm^2   Change in Wound Size % (l*w) -318   Wound Volume (cm^3) 1. 254 cm^3   Wound Healing % -527   Wound Assessment Slough;Pink/red   Drainage Amount Small (< 25%)   Drainage Description Serosanguinous   Odor None   Janet-wound Assessment Blanchable erythema   Margins Epibole (rolled edges)   Wound 06/23/23 Toe (Comment  which one) Left;Dorsal #3 left foot 2nd dorsal toe   Date First Assessed/Time First

## 2023-08-17 NOTE — FLOWSHEET NOTE
08/17/23 1154   Wound 06/23/23 Foot Right;Lateral #1   Date First Assessed/Time First Assessed: 06/23/23 1004   Present on Hospital Admission: Yes  Location: Foot  Wound Location Orientation: Right;Lateral  Wound Description (Comments): #1   Wound Cleansed Vashe   Dressing/Treatment Other (comment);Gauze dressing/dressing sponge;Roll gauze;Tape/Soft cloth adhesive tape  (Santyl ointment nessa thick)   Wound 06/23/23 Foot Left;Medial #2   Date First Assessed/Time First Assessed: 06/23/23 1005   Present on Hospital Admission: Yes  Location: Foot  Wound Location Orientation: Left;Medial  Wound Description (Comments): #2   Wound Cleansed Vashe   Dressing/Treatment Gauze dressing/dressing sponge;Roll gauze; Other (comment); Tape/Soft cloth adhesive tape  (Santyl)     Discharge Condition: Stable    Pain: 5    Ambulatory Status:Wheelchair    Discharge Destination: Home    Transportation:Car    Accompanied by: Self, Family, and Caregiver    Discharge instructions reviewed with Self, Family, and Caregiver and copy or written instructions have been provided. All questions/concerns have been addressed at this time.

## 2023-08-17 NOTE — DISCHARGE INSTRUCTIONS
Discharge Instructions for  [address]  Telephone: [phone]     NAME:  Danish Montez OF BIRTH:  1934  ACCOUNT NUMBER :  [de-identified]  DATE:  8/17/2023     HOME HEALTH: [facility] [employer]     Purchase a Gel cushion (purple cushion off brand) for wheelchair to help reduce pressure on buttocks    Discontinue use of Adaptic    Get 2nd dose of Dalvance     Wound Cleansing:   Do not scrub or use excessive force. Cleanse wound prior to applying a clean dressing with:    [x] VASHE wash    [] Cleanse wound with: BABY SHAMPOO LATHER AND  LEAVE 1-2 MINUTES ON WOUND THEN RINSE WITH WATER OR SALINE    [] May Shower at Discharge     [] Shower on days of dressing change, remove dressing first    [] Keep Wound Dry in Shower (may purchase a cast cover if needed)      Topical Treatments:  Do not apply lotions, creams, or ointments to wound bed unless directed. [] Apply moisturizing lotion to skin surrounding the wound prior to dressing change. [] Other:                 Dressings:      Wound Location Right foot and Left foot               Apply Primary Dressing:                    [x] Santyl ointment - nickel thick     Single tubi  bilaterally     Cover and Secure with:          [x] Gauze          [x] Rolled gauze, tape              Avoid contact of tape with skin     Change dressing:        [x] Daily             [] Every Other Day      [] Three times per week  [] Other:     [x] Turn every 2 hours when in bed. Avoid position directing pressure on wound site. Limit side lying to 30 degree tilt. Limit HOB elevation to 30 degrees.                 Off-Loading:   [x] Foam Boot(s) at night when sleeping, or try without  [x] Other: Diabetic slippers non skid      Dietary:  [x] Increase Protein: examples (Meat, cheese, eggs, greek yogurt, premier protein drink, fish, nuts)          [x] Victor M  [] Protien drink supplement   []

## 2023-08-21 ENCOUNTER — HOSPITAL ENCOUNTER (OUTPATIENT)
Facility: HOSPITAL | Age: 88
Setting detail: INFUSION SERIES
End: 2023-08-21
Payer: MEDICARE

## 2023-08-21 VITALS
RESPIRATION RATE: 16 BRPM | OXYGEN SATURATION: 97 % | TEMPERATURE: 98.4 F | DIASTOLIC BLOOD PRESSURE: 69 MMHG | SYSTOLIC BLOOD PRESSURE: 122 MMHG | HEART RATE: 73 BPM

## 2023-08-21 DIAGNOSIS — L03.116 CELLULITIS OF LEFT FOOT: Primary | ICD-10-CM

## 2023-08-21 PROCEDURE — 96365 THER/PROPH/DIAG IV INF INIT: CPT

## 2023-08-21 PROCEDURE — 6360000002 HC RX W HCPCS: Performed by: FAMILY MEDICINE

## 2023-08-21 PROCEDURE — 2580000003 HC RX 258: Performed by: FAMILY MEDICINE

## 2023-08-21 RX ADMIN — DALBAVANCIN 500 MG: 500 INJECTION, POWDER, FOR SOLUTION INTRAVENOUS at 16:05

## 2023-08-21 ASSESSMENT — PAIN DESCRIPTION - LOCATION: LOCATION: FOOT

## 2023-08-21 ASSESSMENT — PAIN DESCRIPTION - DESCRIPTORS: DESCRIPTORS: STABBING;THROBBING

## 2023-08-21 ASSESSMENT — PAIN - FUNCTIONAL ASSESSMENT: PAIN_FUNCTIONAL_ASSESSMENT: INTOLERABLE, UNABLE TO DO ANY ACTIVE OR PASSIVE ACTIVITIES

## 2023-08-21 ASSESSMENT — PAIN DESCRIPTION - ORIENTATION: ORIENTATION: RIGHT;LEFT

## 2023-08-21 ASSESSMENT — PAIN SCALES - GENERAL: PAINLEVEL_OUTOF10: 8

## 2023-08-22 ENCOUNTER — FOLLOWUP TELEPHONE ENCOUNTER (OUTPATIENT)
Facility: HOSPITAL | Age: 88
End: 2023-08-22

## 2023-08-22 ENCOUNTER — TELEPHONE (OUTPATIENT)
Age: 88
End: 2023-08-22

## 2023-08-22 DIAGNOSIS — L97.522 DIABETIC ULCER OF TOE OF LEFT FOOT ASSOCIATED WITH TYPE 2 DIABETES MELLITUS, WITH FAT LAYER EXPOSED (HCC): Primary | ICD-10-CM

## 2023-08-22 DIAGNOSIS — E11.621 DIABETIC ULCER OF TOE OF LEFT FOOT ASSOCIATED WITH TYPE 2 DIABETES MELLITUS, WITH FAT LAYER EXPOSED (HCC): Primary | ICD-10-CM

## 2023-08-22 NOTE — TELEPHONE ENCOUNTER
Patients wife is requesting patient be prescribed tramadol for wound pain. Mrs Nemesio Velazquez is requesting a call back from a nurse to confirm if the medication can be prescribed.      Phone: [phone]

## 2023-08-22 NOTE — TELEPHONE ENCOUNTER
Patient's wife called stating patient is having a hard time sleeping at night due to pain. Patient's wife is requesting Tramadol for pain management. This RN contacted Dr. Bonita Medina via telephone. Dr. Bonita Medina stated she had left the office for the day and would like to prescribe the Tramadol at patient's next in-person visit this Thursday, 8/24. This RN relayed Dr. Kisha Nickerson to patient's wife. Wife unhappy with having to wait until Thursday for pain medication, but was accepting. No further questions or concerns at this time.

## 2023-08-22 NOTE — TELEPHONE ENCOUNTER
Call placed to patient's wife states that wound is located on left foot. Pictures are uploaded onto chart from [facility], states wound is worsening and patient wakes up in the middle of night crying from pain.    Requesting Tramadol for ongoing wound pain  6.20.23  10.18.23

## 2023-08-23 RX ORDER — TRAMADOL HYDROCHLORIDE 50 MG/1
50 TABLET ORAL 2 TIMES DAILY PRN
Qty: 30 TABLET | Refills: 0 | Status: SHIPPED | OUTPATIENT
Start: 2023-08-23 | End: 2023-08-24 | Stop reason: SDUPTHER

## 2023-08-24 ENCOUNTER — HOSPITAL ENCOUNTER (OUTPATIENT)
Facility: HOSPITAL | Age: 88
Discharge: HOME | End: 2023-08-24
Payer: MEDICARE

## 2023-08-24 VITALS
SYSTOLIC BLOOD PRESSURE: 89 MMHG | TEMPERATURE: 98.2 F | HEART RATE: 69 BPM | RESPIRATION RATE: 16 BRPM | DIASTOLIC BLOOD PRESSURE: 55 MMHG

## 2023-08-24 DIAGNOSIS — L97.524 CHRONIC FOOT ULCER WITH NECROSIS OF BONE, LEFT (HCC): ICD-10-CM

## 2023-08-24 DIAGNOSIS — E11.621 DIABETIC ULCER OF TOE OF LEFT FOOT ASSOCIATED WITH TYPE 2 DIABETES MELLITUS, WITH FAT LAYER EXPOSED (HCC): Primary | ICD-10-CM

## 2023-08-24 DIAGNOSIS — L97.522 DIABETIC ULCER OF TOE OF LEFT FOOT ASSOCIATED WITH TYPE 2 DIABETES MELLITUS, WITH FAT LAYER EXPOSED (HCC): Primary | ICD-10-CM

## 2023-08-24 DIAGNOSIS — I25.10 CORONARY ARTERY DISEASE INVOLVING NATIVE CORONARY ARTERY OF NATIVE HEART WITHOUT ANGINA PECTORIS: ICD-10-CM

## 2023-08-24 DIAGNOSIS — L03.116 CELLULITIS OF LEFT FOOT: ICD-10-CM

## 2023-08-24 PROCEDURE — 11043 DBRDMT MUSC&/FSCA 1ST 20/<: CPT

## 2023-08-24 RX ORDER — LIDOCAINE HYDROCHLORIDE 40 MG/ML
SOLUTION TOPICAL ONCE
OUTPATIENT
Start: 2023-08-24 | End: 2023-08-24

## 2023-08-24 RX ORDER — SODIUM CHLOR/HYPOCHLOROUS ACID 0.033 %
SOLUTION, IRRIGATION IRRIGATION ONCE
OUTPATIENT
Start: 2023-08-24 | End: 2023-08-24

## 2023-08-24 RX ORDER — LIDOCAINE HYDROCHLORIDE 20 MG/ML
JELLY TOPICAL ONCE
OUTPATIENT
Start: 2023-08-24 | End: 2023-08-24

## 2023-08-24 RX ORDER — BACITRACIN ZINC AND POLYMYXIN B SULFATE 500; 1000 [USP'U]/G; [USP'U]/G
OINTMENT TOPICAL ONCE
OUTPATIENT
Start: 2023-08-24 | End: 2023-08-24

## 2023-08-24 RX ORDER — LIDOCAINE 40 MG/G
CREAM TOPICAL ONCE
OUTPATIENT
Start: 2023-08-24 | End: 2023-08-24

## 2023-08-24 RX ORDER — IBUPROFEN 200 MG
TABLET ORAL ONCE
OUTPATIENT
Start: 2023-08-24 | End: 2023-08-24

## 2023-08-24 RX ORDER — LIDOCAINE 50 MG/G
OINTMENT TOPICAL ONCE
OUTPATIENT
Start: 2023-08-24 | End: 2023-08-24

## 2023-08-24 RX ORDER — GENTAMICIN SULFATE 1 MG/G
OINTMENT TOPICAL ONCE
OUTPATIENT
Start: 2023-08-24 | End: 2023-08-24

## 2023-08-24 RX ORDER — BETAMETHASONE DIPROPIONATE 0.05 %
OINTMENT (GRAM) TOPICAL ONCE
OUTPATIENT
Start: 2023-08-24 | End: 2023-08-24

## 2023-08-24 RX ORDER — CLOBETASOL PROPIONATE 0.5 MG/G
OINTMENT TOPICAL ONCE
OUTPATIENT
Start: 2023-08-24 | End: 2023-08-24

## 2023-08-24 RX ORDER — TRAMADOL HYDROCHLORIDE 50 MG/1
50 TABLET ORAL 2 TIMES DAILY PRN
Qty: 30 TABLET | Refills: 0 | Status: SHIPPED | OUTPATIENT
Start: 2023-08-24 | End: 2023-09-23

## 2023-08-24 RX ORDER — GINSENG 100 MG
CAPSULE ORAL ONCE
OUTPATIENT
Start: 2023-08-24 | End: 2023-08-24

## 2023-08-24 ASSESSMENT — PAIN DESCRIPTION - ORIENTATION: ORIENTATION: LEFT;RIGHT

## 2023-08-24 ASSESSMENT — PAIN DESCRIPTION - LOCATION: LOCATION: FOOT

## 2023-08-24 ASSESSMENT — PAIN SCALES - GENERAL: PAINLEVEL_OUTOF10: 5

## 2023-08-24 NOTE — DISCHARGE INSTRUCTIONS
Discharge Instructions for  [address]  Telephone: [phone]     NAME:  Blossom Bradley OF BIRTH:  1934  ACCOUNT NUMBER :  [de-identified]  DATE:  8/24/2023     HOME HEALTH: [employer]    Return to Dr. Giuseppe Tapia at [facility]      Wound Cleansing:   Do not scrub or use excessive force. Cleanse wound prior to applying a clean dressing with:    [x] VASHE wash    [] Cleanse wound with: BABY SHAMPOO LATHER AND  LEAVE 1-2 MINUTES ON WOUND THEN RINSE WITH WATER OR SALINE    [] May Shower at Discharge     [] Shower on days of dressing change, remove dressing first    [] Keep Wound Dry in Shower (may purchase a cast cover if needed)      Topical Treatments:  Do not apply lotions, creams, or ointments to wound bed unless directed. [] Apply moisturizing lotion to skin surrounding the wound prior to dressing change. [] Other:                 Dressings:      Wound Location Right foot and Left foot               Apply Primary Dressing:                    [x] Santyl ointment - nickel thick mixed with mupirocin ointment      Single tubi  bilaterally     Cover and Secure with:          [x] Gauze          [x] Rolled gauze (half a roll), and tape              Avoid contact of tape with skin     Change dressing:        [x] Daily             [] Every Other Day      [] Three times per week  [] Other:     [x] Turn every 2 hours when in bed. Avoid position directing pressure on wound site. Limit side lying to 30 degree tilt. Limit HOB elevation to 30 degrees.                 Off-Loading:   [x] Foam Boot(s) at night when sleeping  [x] Other: Diabetic slippers non skid      Dietary:  [x] Increase Protein: examples (Meat, cheese, eggs, greek yogurt, premier protein drink, fish, nuts)          [x] Victor M  [] Protien drink supplement   [] Recommended Supplements     Return Appointment:     [x] Return Appointment: With

## 2023-08-24 NOTE — PROGRESS NOTES
adhesive tape 23 1220   Offloading for Diabetic Foot Ulcers Offloading ordered 08/10/23 1254   Wound Length (cm) 2.3 cm 23 1126   Wound Width (cm) 2.7 cm 23 1126   Wound Depth (cm) 0.3 cm 23 1126   Wound Surface Area (cm^2) 6.21 cm^2 23 1126   Change in Wound Size % (l*w) -521 23 1126   Wound Volume (cm^3) 1.863 cm^3 23 1126   Wound Healing % -832 23 1126   Post-Procedure Length (cm) 2.3 cm 23 1206   Post-Procedure Width (cm) 2.7 cm 23 1206   Post-Procedure Depth (cm) 0.6 cm 23 1206   Post-Procedure Surface Area (cm^2) 6.21 cm^2 23 1206   Post-Procedure Volume (cm^3) 3.726 cm^3 23 1206   Wound Assessment Slough;Pink/red 23 1126   Drainage Amount Moderate (25-50%) 23 1126   Drainage Description Serosanguinous 23 1126   Odor None 23 1126   Janet-wound Assessment Blanchable erythema 23 1126   Margins Epibole (rolled edges) 23 1126   Wound Thickness Description not for Pressure Injury Full thickness 23 1126   Number of days: 62          -------    Past Medical History:   Diagnosis Date    Diabetes ([address])     Heart failure ([address])     heart attack 2005    Heart failure (HCC)     heart murmur    Hypertension     Prostate cancer ([address])      Past Surgical History:   Procedure Laterality Date    FRACTURE SURGERY      left hip 3years ago    OTHER SURGICAL HISTORY      radiation to start endd of month    TONSILLECTOMY       History reviewed. No pertinent family history. Social History     Socioeconomic History    Marital status:      Spouse name: None    Number of children: None    Years of education: None    Highest education level: None   Tobacco Use    Smoking status: Former     Types: Cigarettes, Pipe     Quit date:      Years since quittin.6    Smokeless tobacco: Never   Vaping Use    Vaping Use: Never used   Substance and Sexual Activity    Alcohol use: Not Currently    Drug use:  No

## 2023-08-24 NOTE — FLOWSHEET NOTE
08/24/23 1126   Anesthetic   Anesthetic 4% Lidocaine Liquid Topical   Right Leg Edema Point of Measurement   Leg circumference 30.5 cm   Ankle circumference 20 cm   Left Leg Edema Point of Measurement   Leg circumference 32 cm   Ankle circumference 21 cm   RLE Neurovascular Assessment   Capillary Refill Less than/Equal to 3 seconds   Color Appropriate for Ethnicity   Temperature Cool   R Pedal Pulse +2   LLE Neurovascular Assessment   Capillary Refill Less than/Equal to 3 seconds   Color Red   Temperature Warm   L Pedal Pulse +1   Wound 06/23/23 Foot Right;Lateral #1   Date First Assessed/Time First Assessed: 06/23/23 1004   Present on Hospital Admission: Yes  Location: Foot  Wound Location Orientation: Right;Lateral  Wound Description (Comments): #1   Wound Image    Wound Cleansed Cleansed with saline   Wound Length (cm) 0.7 cm   Wound Width (cm) 0.6 cm   Wound Depth (cm) 0.2 cm   Wound Surface Area (cm^2) 0.42 cm^2   Change in Wound Size % (l*w) 25   Wound Volume (cm^3) 0.084 cm^3   Wound Assessment Other (Comment); Slough  (dried exudate)   Drainage Amount Moderate (25-50%)   Drainage Description Serosanguinous   Odor None   Janet-wound Assessment Blanchable erythema   Margins Epibole (rolled edges)   Wound 06/23/23 Foot Left;Medial #2   Date First Assessed/Time First Assessed: 06/23/23 1005   Present on Hospital Admission: Yes  Location: Foot  Wound Location Orientation: Left;Medial  Wound Description (Comments): #2   Wound Image    Wound Cleansed Cleansed with saline   Wound Length (cm) 2.3 cm   Wound Width (cm) 2.7 cm   Wound Depth (cm) 0.3 cm   Wound Surface Area (cm^2) 6.21 cm^2   Change in Wound Size % (l*w) -521   Wound Volume (cm^3) 1.863 cm^3   Wound Healing % -832   Wound Assessment Slough;Pink/red   Drainage Amount Moderate (25-50%)   Drainage Description Serosanguinous   Odor None   Janet-wound Assessment Blanchable erythema   Margins Epibole (rolled edges)   Wound Thickness Description not for

## 2023-08-29 ENCOUNTER — FOLLOWUP TELEPHONE ENCOUNTER (OUTPATIENT)
Facility: HOSPITAL | Age: 88
End: 2023-08-29

## 2023-08-29 NOTE — TELEPHONE ENCOUNTER
Patient's wife called stating that patient will not be returning to wound care clinic and is now going to seek care from [address] [place] [address]. neela's wife states increased concern for patient's wounds. Their appointment with [facility] is this Thursday, 8/31. This RN attempting to fax resent notes to [facility].    This RN also suggested [facility], but wife did not seem interested in idea. Patient also has vascular appointment tomorrow to re-assess arterial blood flow to lower extremities as this is one of Dr. Kyleigh Molina concerns for lack of improvement. Culture is pending at this time. Dr. Sabrina Quintana to review on Thursday when she is back in clinic.   Plan of care ongoing until patient switches to [facility].

## 2023-08-30 ENCOUNTER — FOLLOWUP TELEPHONE ENCOUNTER (OUTPATIENT)
Facility: HOSPITAL | Age: 88
End: 2023-08-30

## 2023-08-30 ENCOUNTER — TELEPHONE (OUTPATIENT)
Facility: HOSPITAL | Age: 88
End: 2023-08-30

## 2023-08-30 NOTE — TELEPHONE ENCOUNTER
This RN spoke with patient's wife. Patient is currently in [facility] ER at suggestion of vascular physician. Culture results received, still MRSA positive, and faxed over the [facility] ER. Patient's wife made aware.

## 2023-08-30 NOTE — TELEPHONE ENCOUNTER
Patient's wife called office today and LVM reports patient was seen at Vascular today and was told that antibiotics will not help infection and that it may have gone to the bone vascular provider has recommended patient to go to ER. Wife states she is taking patient to [facility] ER for further medical treatment.      Message given to Grecia Celestin RN and

## 2023-09-05 ENCOUNTER — FOLLOWUP TELEPHONE ENCOUNTER (OUTPATIENT)
Facility: HOSPITAL | Age: 88
End: 2023-09-05

## 2023-09-05 ENCOUNTER — CLINICAL DOCUMENTATION (OUTPATIENT)
Age: 88
End: 2023-09-05

## 2023-09-05 NOTE — PROGRESS NOTES
Wife called,Pt is at [facility] - had toe amputated. While pt was in the ER he rolled out of his bed and broke two places in his spine.  States pt was trying to get out of the bed to go catch the bus

## 2023-09-05 NOTE — TELEPHONE ENCOUNTER
Per pt's wife phone call w/ Vikas Valiente RN:    Patients wife called and stated patient was having toe amputated within the next 10 mins. , toe became infected down to bone, stated she was really upset, she wanted this nurse to let Dr. Samm Guillen know, and also that in the last 7 weeks patient had been coming to this wound care center, he did not get any better. Wife also stated while patient was in the ER last Thursday, they were waiting for a bed, wife and daughter left, patient fell off of stretcher and now has fractures in his vertebrae. This nurse stated she was sorry to hear this and will relay the message to [facility]  for Dr. Stanislaw Rowland.

## 2023-10-06 DIAGNOSIS — E11.42 TYPE 2 DIABETES MELLITUS WITH DIABETIC POLYNEUROPATHY, WITHOUT LONG-TERM CURRENT USE OF INSULIN (HCC): Primary | ICD-10-CM

## 2023-10-06 NOTE — TELEPHONE ENCOUNTER
Requested Prescriptions     Pending Prescriptions Disp Refills    metFORMIN (GLUCOPHAGE) 500 MG tablet 90 tablet 1     Sig: Take 2 tablets by mouth daily (with breakfast)       [id number] - Fred[address], [address] [phone] Thelma Lemus [phone]  [address] [address]  Phone: [phone] Fax: [phone]       Last appt 6/20/2023      Future Appointments   Date Time Provider [address] [place] [address]   10/18/2023 10:45 AM DO STEVIE Montoya BS [facility]